# Patient Record
Sex: MALE | Race: BLACK OR AFRICAN AMERICAN | NOT HISPANIC OR LATINO | URBAN - METROPOLITAN AREA
[De-identification: names, ages, dates, MRNs, and addresses within clinical notes are randomized per-mention and may not be internally consistent; named-entity substitution may affect disease eponyms.]

---

## 2018-08-22 ENCOUNTER — OUTPATIENT (OUTPATIENT)
Dept: OUTPATIENT SERVICES | Facility: HOSPITAL | Age: 83
LOS: 1 days | End: 2018-08-22
Payer: MEDICARE

## 2018-08-22 ENCOUNTER — APPOINTMENT (OUTPATIENT)
Dept: MRI IMAGING | Facility: CLINIC | Age: 83
End: 2018-08-22

## 2018-08-22 DIAGNOSIS — Z00.8 ENCOUNTER FOR OTHER GENERAL EXAMINATION: ICD-10-CM

## 2018-08-22 PROCEDURE — 70551 MRI BRAIN STEM W/O DYE: CPT

## 2018-11-25 ENCOUNTER — INPATIENT (INPATIENT)
Facility: HOSPITAL | Age: 83
LOS: 2 days | Discharge: NURSING, REHAB OR LONG-TERM CARE | End: 2018-11-28
Attending: INTERNAL MEDICINE | Admitting: INTERNAL MEDICINE
Payer: MEDICARE

## 2018-11-25 VITALS
HEIGHT: 68 IN | OXYGEN SATURATION: 100 % | WEIGHT: 149.91 LBS | HEART RATE: 58 BPM | SYSTOLIC BLOOD PRESSURE: 138 MMHG | RESPIRATION RATE: 19 BRPM | DIASTOLIC BLOOD PRESSURE: 67 MMHG | TEMPERATURE: 98 F

## 2018-11-25 DIAGNOSIS — N18.3 CHRONIC KIDNEY DISEASE, STAGE 3 (MODERATE): ICD-10-CM

## 2018-11-25 DIAGNOSIS — I10 ESSENTIAL (PRIMARY) HYPERTENSION: ICD-10-CM

## 2018-11-25 DIAGNOSIS — R40.4 TRANSIENT ALTERATION OF AWARENESS: ICD-10-CM

## 2018-11-25 DIAGNOSIS — E16.2 HYPOGLYCEMIA, UNSPECIFIED: ICD-10-CM

## 2018-11-25 DIAGNOSIS — Z98.49 CATARACT EXTRACTION STATUS, UNSPECIFIED EYE: Chronic | ICD-10-CM

## 2018-11-25 DIAGNOSIS — H54.8 LEGAL BLINDNESS, AS DEFINED IN USA: ICD-10-CM

## 2018-11-25 DIAGNOSIS — E11.65 TYPE 2 DIABETES MELLITUS WITH HYPERGLYCEMIA: ICD-10-CM

## 2018-11-25 DIAGNOSIS — F03.90 UNSPECIFIED DEMENTIA WITHOUT BEHAVIORAL DISTURBANCE: ICD-10-CM

## 2018-11-25 LAB
ALBUMIN SERPL ELPH-MCNC: 2 G/DL — LOW (ref 3.3–5)
ALP SERPL-CCNC: 59 U/L — SIGNIFICANT CHANGE UP (ref 40–120)
ALT FLD-CCNC: 19 U/L — SIGNIFICANT CHANGE UP (ref 12–78)
ANION GAP SERPL CALC-SCNC: 9 MMOL/L — SIGNIFICANT CHANGE UP (ref 5–17)
APPEARANCE UR: CLEAR — SIGNIFICANT CHANGE UP
APTT BLD: 31.8 SEC — SIGNIFICANT CHANGE UP (ref 27.5–36.3)
AST SERPL-CCNC: 17 U/L — SIGNIFICANT CHANGE UP (ref 15–37)
BACTERIA # UR AUTO: ABNORMAL
BASOPHILS # BLD AUTO: 0.02 K/UL — SIGNIFICANT CHANGE UP (ref 0–0.2)
BASOPHILS NFR BLD AUTO: 0.3 % — SIGNIFICANT CHANGE UP (ref 0–2)
BILIRUB SERPL-MCNC: 0.2 MG/DL — SIGNIFICANT CHANGE UP (ref 0.2–1.2)
BILIRUB UR-MCNC: NEGATIVE — SIGNIFICANT CHANGE UP
BUN SERPL-MCNC: 42 MG/DL — HIGH (ref 7–23)
CALCIUM SERPL-MCNC: 8.6 MG/DL — SIGNIFICANT CHANGE UP (ref 8.5–10.1)
CHLORIDE SERPL-SCNC: 111 MMOL/L — HIGH (ref 96–108)
CO2 SERPL-SCNC: 25 MMOL/L — SIGNIFICANT CHANGE UP (ref 22–31)
COLOR SPEC: YELLOW — SIGNIFICANT CHANGE UP
CREAT SERPL-MCNC: 1.83 MG/DL — HIGH (ref 0.5–1.3)
DIFF PNL FLD: NEGATIVE — SIGNIFICANT CHANGE UP
EOSINOPHIL # BLD AUTO: 0.1 K/UL — SIGNIFICANT CHANGE UP (ref 0–0.5)
EOSINOPHIL NFR BLD AUTO: 1.4 % — SIGNIFICANT CHANGE UP (ref 0–6)
EPI CELLS # UR: SIGNIFICANT CHANGE UP
GLUCOSE BLDC GLUCOMTR-MCNC: 122 MG/DL — HIGH (ref 70–99)
GLUCOSE BLDC GLUCOMTR-MCNC: 125 MG/DL — HIGH (ref 70–99)
GLUCOSE BLDC GLUCOMTR-MCNC: 131 MG/DL — HIGH (ref 70–99)
GLUCOSE BLDC GLUCOMTR-MCNC: 79 MG/DL — SIGNIFICANT CHANGE UP (ref 70–99)
GLUCOSE SERPL-MCNC: 107 MG/DL — HIGH (ref 70–99)
GLUCOSE UR QL: 100 MG/DL
HCT VFR BLD CALC: 33.6 % — LOW (ref 39–50)
HGB BLD-MCNC: 9.9 G/DL — LOW (ref 13–17)
IMM GRANULOCYTES NFR BLD AUTO: 1.2 % — SIGNIFICANT CHANGE UP (ref 0–1.5)
INR BLD: 1.16 RATIO — SIGNIFICANT CHANGE UP (ref 0.88–1.16)
KETONES UR-MCNC: NEGATIVE — SIGNIFICANT CHANGE UP
LACTATE SERPL-SCNC: 1.1 MMOL/L — SIGNIFICANT CHANGE UP (ref 0.7–2)
LEUKOCYTE ESTERASE UR-ACNC: ABNORMAL
LYMPHOCYTES # BLD AUTO: 1.26 K/UL — SIGNIFICANT CHANGE UP (ref 1–3.3)
LYMPHOCYTES # BLD AUTO: 18.1 % — SIGNIFICANT CHANGE UP (ref 13–44)
MCHC RBC-ENTMCNC: 23.2 PG — LOW (ref 27–34)
MCHC RBC-ENTMCNC: 29.5 GM/DL — LOW (ref 32–36)
MCV RBC AUTO: 78.7 FL — LOW (ref 80–100)
MONOCYTES # BLD AUTO: 0.59 K/UL — SIGNIFICANT CHANGE UP (ref 0–0.9)
MONOCYTES NFR BLD AUTO: 8.5 % — SIGNIFICANT CHANGE UP (ref 2–14)
NEUTROPHILS # BLD AUTO: 4.9 K/UL — SIGNIFICANT CHANGE UP (ref 1.8–7.4)
NEUTROPHILS NFR BLD AUTO: 70.5 % — SIGNIFICANT CHANGE UP (ref 43–77)
NITRITE UR-MCNC: NEGATIVE — SIGNIFICANT CHANGE UP
NRBC # BLD: 0 /100 WBCS — SIGNIFICANT CHANGE UP (ref 0–0)
PH UR: 5 — SIGNIFICANT CHANGE UP (ref 5–8)
PLATELET # BLD AUTO: 291 K/UL — SIGNIFICANT CHANGE UP (ref 150–400)
POTASSIUM SERPL-MCNC: 4.5 MMOL/L — SIGNIFICANT CHANGE UP (ref 3.5–5.3)
POTASSIUM SERPL-SCNC: 4.5 MMOL/L — SIGNIFICANT CHANGE UP (ref 3.5–5.3)
PROT SERPL-MCNC: 6.7 GM/DL — SIGNIFICANT CHANGE UP (ref 6–8.3)
PROT UR-MCNC: 15 MG/DL
PROTHROM AB SERPL-ACNC: 13 SEC — HIGH (ref 10–12.9)
RBC # BLD: 4.27 M/UL — SIGNIFICANT CHANGE UP (ref 4.2–5.8)
RBC # FLD: 17.5 % — HIGH (ref 10.3–14.5)
SODIUM SERPL-SCNC: 145 MMOL/L — SIGNIFICANT CHANGE UP (ref 135–145)
SP GR SPEC: 1.01 — SIGNIFICANT CHANGE UP (ref 1.01–1.02)
TSH SERPL-MCNC: 1.79 UIU/ML — SIGNIFICANT CHANGE UP (ref 0.36–3.74)
UROBILINOGEN FLD QL: NEGATIVE MG/DL — SIGNIFICANT CHANGE UP
WBC # BLD: 6.95 K/UL — SIGNIFICANT CHANGE UP (ref 3.8–10.5)
WBC # FLD AUTO: 6.95 K/UL — SIGNIFICANT CHANGE UP (ref 3.8–10.5)
WBC UR QL: SIGNIFICANT CHANGE UP

## 2018-11-25 PROCEDURE — 99285 EMERGENCY DEPT VISIT HI MDM: CPT

## 2018-11-25 PROCEDURE — 70450 CT HEAD/BRAIN W/O DYE: CPT | Mod: 26

## 2018-11-25 PROCEDURE — 73562 X-RAY EXAM OF KNEE 3: CPT | Mod: 26,LT

## 2018-11-25 PROCEDURE — 93010 ELECTROCARDIOGRAM REPORT: CPT

## 2018-11-25 PROCEDURE — 71045 X-RAY EXAM CHEST 1 VIEW: CPT | Mod: 26

## 2018-11-25 RX ORDER — DEXTROSE 50 % IN WATER 50 %
12.5 SYRINGE (ML) INTRAVENOUS ONCE
Qty: 0 | Refills: 0 | Status: DISCONTINUED | OUTPATIENT
Start: 2018-11-25 | End: 2018-11-28

## 2018-11-25 RX ORDER — NEOMYCIN/POLYMYXIN B/DEXAMETHA 0.1 %
0 SUSPENSION, DROPS(FINAL DOSAGE FORM)(ML) OPHTHALMIC (EYE)
Qty: 0 | Refills: 0 | COMMUNITY

## 2018-11-25 RX ORDER — ATORVASTATIN CALCIUM 80 MG/1
80 TABLET, FILM COATED ORAL AT BEDTIME
Qty: 0 | Refills: 0 | Status: DISCONTINUED | OUTPATIENT
Start: 2018-11-25 | End: 2018-11-28

## 2018-11-25 RX ORDER — METOPROLOL TARTRATE 50 MG
50 TABLET ORAL DAILY
Qty: 0 | Refills: 0 | Status: DISCONTINUED | OUTPATIENT
Start: 2018-11-25 | End: 2018-11-28

## 2018-11-25 RX ORDER — ESCITALOPRAM OXALATE 10 MG/1
10 TABLET, FILM COATED ORAL DAILY
Qty: 0 | Refills: 0 | Status: DISCONTINUED | OUTPATIENT
Start: 2018-11-25 | End: 2018-11-28

## 2018-11-25 RX ORDER — CALCITRIOL 0.5 UG/1
0.25 CAPSULE ORAL DAILY
Qty: 0 | Refills: 0 | Status: DISCONTINUED | OUTPATIENT
Start: 2018-11-25 | End: 2018-11-28

## 2018-11-25 RX ORDER — CLOPIDOGREL BISULFATE 75 MG/1
75 TABLET, FILM COATED ORAL DAILY
Qty: 0 | Refills: 0 | Status: DISCONTINUED | OUTPATIENT
Start: 2018-11-25 | End: 2018-11-28

## 2018-11-25 RX ORDER — GLUCAGON INJECTION, SOLUTION 0.5 MG/.1ML
1 INJECTION, SOLUTION SUBCUTANEOUS ONCE
Qty: 0 | Refills: 0 | Status: DISCONTINUED | OUTPATIENT
Start: 2018-11-25 | End: 2018-11-28

## 2018-11-25 RX ORDER — LANOLIN ALCOHOL/MO/W.PET/CERES
1 CREAM (GRAM) TOPICAL
Qty: 0 | Refills: 0 | COMMUNITY

## 2018-11-25 RX ORDER — DOXAZOSIN MESYLATE 4 MG
1 TABLET ORAL AT BEDTIME
Qty: 0 | Refills: 0 | Status: DISCONTINUED | OUTPATIENT
Start: 2018-11-25 | End: 2018-11-28

## 2018-11-25 RX ORDER — INSULIN LISPRO 100/ML
VIAL (ML) SUBCUTANEOUS
Qty: 0 | Refills: 0 | Status: DISCONTINUED | OUTPATIENT
Start: 2018-11-25 | End: 2018-11-28

## 2018-11-25 RX ORDER — FINASTERIDE 5 MG/1
1 TABLET, FILM COATED ORAL
Qty: 0 | Refills: 0 | COMMUNITY

## 2018-11-25 RX ORDER — DONEPEZIL HYDROCHLORIDE 10 MG/1
10 TABLET, FILM COATED ORAL AT BEDTIME
Qty: 0 | Refills: 0 | Status: DISCONTINUED | OUTPATIENT
Start: 2018-11-25 | End: 2018-11-28

## 2018-11-25 RX ORDER — SODIUM CHLORIDE 9 MG/ML
1000 INJECTION, SOLUTION INTRAVENOUS
Qty: 0 | Refills: 0 | Status: DISCONTINUED | OUTPATIENT
Start: 2018-11-25 | End: 2018-11-28

## 2018-11-25 RX ORDER — METOPROLOL TARTRATE 50 MG
1 TABLET ORAL
Qty: 0 | Refills: 0 | COMMUNITY

## 2018-11-25 RX ORDER — ESCITALOPRAM OXALATE 10 MG/1
1 TABLET, FILM COATED ORAL
Qty: 0 | Refills: 0 | COMMUNITY

## 2018-11-25 RX ORDER — SODIUM CHLORIDE 9 MG/ML
1000 INJECTION INTRAMUSCULAR; INTRAVENOUS; SUBCUTANEOUS
Qty: 0 | Refills: 0 | Status: DISCONTINUED | OUTPATIENT
Start: 2018-11-25 | End: 2018-11-28

## 2018-11-25 RX ORDER — FINASTERIDE 5 MG/1
5 TABLET, FILM COATED ORAL DAILY
Qty: 0 | Refills: 0 | Status: DISCONTINUED | OUTPATIENT
Start: 2018-11-25 | End: 2018-11-28

## 2018-11-25 RX ORDER — ESOMEPRAZOLE MAGNESIUM 40 MG/1
1 CAPSULE, DELAYED RELEASE ORAL
Qty: 0 | Refills: 0 | COMMUNITY

## 2018-11-25 RX ORDER — DEXTROSE 50 % IN WATER 50 %
25 SYRINGE (ML) INTRAVENOUS ONCE
Qty: 0 | Refills: 0 | Status: DISCONTINUED | OUTPATIENT
Start: 2018-11-25 | End: 2018-11-28

## 2018-11-25 RX ORDER — DEXTROSE 50 % IN WATER 50 %
15 SYRINGE (ML) INTRAVENOUS ONCE
Qty: 0 | Refills: 0 | Status: DISCONTINUED | OUTPATIENT
Start: 2018-11-25 | End: 2018-11-28

## 2018-11-25 RX ORDER — HEPARIN SODIUM 5000 [USP'U]/ML
5000 INJECTION INTRAVENOUS; SUBCUTANEOUS EVERY 12 HOURS
Qty: 0 | Refills: 0 | Status: DISCONTINUED | OUTPATIENT
Start: 2018-11-25 | End: 2018-11-28

## 2018-11-25 RX ORDER — LANOLIN ALCOHOL/MO/W.PET/CERES
6 CREAM (GRAM) TOPICAL AT BEDTIME
Qty: 0 | Refills: 0 | Status: DISCONTINUED | OUTPATIENT
Start: 2018-11-25 | End: 2018-11-28

## 2018-11-25 RX ADMIN — SODIUM CHLORIDE 70 MILLILITER(S): 9 INJECTION INTRAMUSCULAR; INTRAVENOUS; SUBCUTANEOUS at 14:24

## 2018-11-25 RX ADMIN — Medication 6 MILLIGRAM(S): at 16:25

## 2018-11-25 RX ADMIN — Medication 1 TABLET(S): at 16:20

## 2018-11-25 RX ADMIN — HEPARIN SODIUM 5000 UNIT(S): 5000 INJECTION INTRAVENOUS; SUBCUTANEOUS at 18:10

## 2018-11-25 RX ADMIN — DONEPEZIL HYDROCHLORIDE 10 MILLIGRAM(S): 10 TABLET, FILM COATED ORAL at 16:21

## 2018-11-25 RX ADMIN — CLOPIDOGREL BISULFATE 75 MILLIGRAM(S): 75 TABLET, FILM COATED ORAL at 16:20

## 2018-11-25 RX ADMIN — ATORVASTATIN CALCIUM 80 MILLIGRAM(S): 80 TABLET, FILM COATED ORAL at 16:21

## 2018-11-25 RX ADMIN — Medication 50 MILLIGRAM(S): at 16:20

## 2018-11-25 RX ADMIN — ESCITALOPRAM OXALATE 10 MILLIGRAM(S): 10 TABLET, FILM COATED ORAL at 16:22

## 2018-11-25 RX ADMIN — FINASTERIDE 5 MILLIGRAM(S): 5 TABLET, FILM COATED ORAL at 16:22

## 2018-11-25 RX ADMIN — CALCITRIOL 0.25 MICROGRAM(S): 0.5 CAPSULE ORAL at 16:21

## 2018-11-25 RX ADMIN — Medication 1 MILLIGRAM(S): at 16:22

## 2018-11-25 NOTE — ED PROVIDER NOTE - PHYSICAL EXAMINATION
Gen: Alert, Well appearing. NAD    Head: NC, AT, PERRL, EOMI, normal lids/conjunctiva   ENT: Bilateral TM WNL, normal hearing, patent oropharynx without erythema/exudate, uvula midline  Neck: supple, no tenderness/meningismus/JVD   Pulm: Bilateral clear BS, normal resp effort, no wheeze/stridor/retractions  CV: RRR, no M/R/G, +dist pulses   Abd: soft, NT/ND, +BS, no guarding/rebound tenderness  Mskel: no edema/erythema/cyanosis   Skin: no rash   Neuro: AAOx3, no sensory/motor deficits, CN 2-12 intact    pt unable to lift leg x 1 week, but reports it is due to the knee. (+ warm/arthritis changes to left knee) able to passively fully range left knee. pulses intact. good dorsi and plantar flexion to left leg

## 2018-11-25 NOTE — ED ADULT NURSE NOTE - OBJECTIVE STATEMENT
as per family, ams, unable to care for self, visual hallucinations,  and unable to stand x 1 week. orient to self. pt ambulatory with cane, orient x3 x 3 weeks ago.

## 2018-11-25 NOTE — H&P ADULT - HISTORY OF PRESENT ILLNESS
· HPI Objective Statement: 85yo male with pmh DM (repaglinide, trajenta and possibly metformin which he took this morning), HTN, CKD, dementia, presents with declining mental status in past few months, however states altered in past week with hallucinations, and urinary/fecal incontinence and nonambulatory and just lying in bed so daughter called ambulance today. Pt noted with FS of 39, given D50 and daughter reports he is better. Pt co knee pain. Pt AOx2 but can reasonably contribute to history. Pt lives home alone. Pt was independent 3 weeks ago using the bus independently.    ROS: No fever/chills. No photophobia/eye pain/changes in vision, No ear pain/sore throat/dysphagia, No chest pain/palpitations. No SOB/cough/stridor. No abdominal pain, N/V/D, no black/bloody bm. No dysuria/frequency/discharge, No headache. No Dizziness.  No rash.  No numbness/tingling/weakness.

## 2018-11-25 NOTE — ED PROVIDER NOTE - OBJECTIVE STATEMENT
83yo male with pmh DM (repaglinide, trajenta and possibly metformin which he took this morning), HTN, CKD, dementia, presents with declining mental status in past few months, however states altered in past week with hallucinations and nonambulatory and just lying in bed so daughter called ambulance today. Pt noted with FS of 39, given D50 and daughter reports he is better. Pt co knee pain. Pt AOx2 but can reasonably contribute to history. Pt lives home alone. Pt was independent 3 weeks ago using the bus independently.      ROS: No fever/chills. No photophobia/eye pain/changes in vision, No ear pain/sore throat/dysphagia, No chest pain/palpitations. No SOB/cough/stridor. No abdominal pain, N/V/D, no black/bloody bm. No dysuria/frequency/discharge, No headache. No Dizziness.  No rash.  No numbness/tingling/weakness. 85yo male with pmh DM (repaglinide, trajenta and possibly metformin which he took this morning), HTN, CKD, dementia, presents with declining mental status in past few months, however states altered in past week with hallucinations, and urinary/fecal incontinence and nonambulatory and just lying in bed so daughter called ambulance today. Pt noted with FS of 39, given D50 and daughter reports he is better. Pt co knee pain. Pt AOx2 but can reasonably contribute to history. Pt lives home alone. Pt was independent 3 weeks ago using the bus independently.      ROS: No fever/chills. No photophobia/eye pain/changes in vision, No ear pain/sore throat/dysphagia, No chest pain/palpitations. No SOB/cough/stridor. No abdominal pain, N/V/D, no black/bloody bm. No dysuria/frequency/discharge, No headache. No Dizziness.  No rash.  No numbness/tingling/weakness.

## 2018-11-25 NOTE — ED PROVIDER NOTE - CARE PLAN
Principal Discharge DX:	Hypoglycemia Principal Discharge DX:	Hypoglycemia  Secondary Diagnosis:	Arthritis

## 2018-11-25 NOTE — H&P ADULT - ASSESSMENT
IMPROVE VTE Individual Risk Assessment    RISK                                                                Points    [  ] Previous VTE                                                  3    [  ] Thrombophilia                                               2    [  ] Lower limb paralysis                                      2        (unable to hold up >15 seconds)      [  ] Current Cancer                                              2         (within 6 months)    [x  ] Immobilization > 24 hrs                                1    [  ] ICU/CCU stay > 24 hours                              1    [ x ] Age > 60                                                      1    IMPROVE VTE Score ___2______    85yo male with pmh DM (repaglinide, trajenta and possibly metformin which he took this morning), HTN, CKD, dementia, presents with declining mental status in past few months, however states altered in past week with hallucinations, and urinary/fecal incontinence and nonambulatory and just lying in bed so daughter called ambulance today. Pt noted with FS of 39, given D50 and daughter reports he is better. Pt co knee pain. Pt AOx2 but can reasonably contribute to history. Pt lives home alone. Pt was independent 3 weeks ago using the bus independently.

## 2018-11-25 NOTE — ED ADULT NURSE NOTE - PMH
Dementia    DM (diabetes mellitus)    Glaucoma    HTN (hypertension) Anemia due to stage 3 chronic kidney disease    Benign prostatic hyperplasia with lower urinary tract symptoms, symptom details unspecified    CKD (chronic kidney disease) stage 3, GFR 30-59 ml/min    Dementia    DM (diabetes mellitus)    Glaucoma    HTN (hypertension)    Legally blind  Both Eyes

## 2018-11-25 NOTE — H&P ADULT - NSHPPHYSICALEXAM_GEN_ALL_CORE
GENERAL: NAD, well-groomed, well-developed  HEAD:  Atraumatic, Normocephalic  EYES: EOMI, PERRL, conjunctiva clear ,sclera scarred bilaterally.  ENMT:  Moist mucous membranes, Good dentition, No lesions  NECK: Supple, No JVD, Normal thyroid  NERVOUS SYSTEM:  Alert & Oriented X3, Good concentration; Motor Strength 5/5 B/L upper and lower extremities; DTRs 2+ intact and symmetric  CHEST/LUNG: Clear to percussion bilaterally; No rales, rhonchi, wheezing, or rubs  HEART: Regular rate and rhythm; No murmurs, rubs, or gallops  ABDOMEN: Soft, Nontender, Nondistended; Bowel sounds present  EXTREMITIES:  2+ Peripheral Pulses, No clubbing, cyanosis, or edema  LYMPH: No lymphadenopathy noted  SKIN: No rashes or lesions

## 2018-11-25 NOTE — ED PROVIDER NOTE - MEDICAL DECISION MAKING DETAILS
pt with hypoglycemia on oral hypoglycemics. will need monitoring. SW  as well but symptoms coulg all be related to hypoglycemia, possibly UTI. dr skinner aware for admission.

## 2018-11-25 NOTE — H&P ADULT - NSHPLABSRESULTS_GEN_ALL_CORE
9.9    6.95  )-----------( 291      ( 25 Nov 2018 14:27 )             33.6     11-25    145  |  111<H>  |  42<H>  ----------------------------<  107<H>  4.5   |  25  |  1.83<H>    Ca    8.6      25 Nov 2018 14:27    TPro  6.7  /  Alb  2.0<L>  /  TBili  0.2  /  DBili  x   /  AST  17  /  ALT  19  /  AlkPhos  59  11-25    PT/INR - ( 25 Nov 2018 14:27 )   PT: 13.0 sec;   INR: 1.16 ratio         PTT - ( 25 Nov 2018 14:27 )  PTT:31.8 sec    CAPILLARY BLOOD GLUCOSE      POCT Blood Glucose.: 79 mg/dL (25 Nov 2018 15:30)  POCT Blood Glucose.: 122 mg/dL (25 Nov 2018 13:48)            Urine Culture:      Blood Culture:    < from: CT Head No Cont (11.25.18 @ 15:12) >    Impression: No evidence of acute hemorrhage, mass or mass effect.    Small focus of high attenuation is seen anterior to the right globe as   described above.    < end of copied text >

## 2018-11-25 NOTE — H&P ADULT - PMH
Anemia due to stage 3 chronic kidney disease    Benign prostatic hyperplasia with lower urinary tract symptoms, symptom details unspecified    CKD (chronic kidney disease) stage 3, GFR 30-59 ml/min    Dementia    DM (diabetes mellitus)    Glaucoma    HTN (hypertension)    Legally blind  Both Eyes

## 2018-11-25 NOTE — ED ADULT TRIAGE NOTE - CHIEF COMPLAINT QUOTE
patient BIBA as per EMS patient FS 39 at home , EMS give D50% 1 amp , patient A&Ox3 at the time of triage , denied headache denied chest pain , c/o of weakness

## 2018-11-26 LAB
ANION GAP SERPL CALC-SCNC: 6 MMOL/L — SIGNIFICANT CHANGE UP (ref 5–17)
BASOPHILS # BLD AUTO: 0.02 K/UL — SIGNIFICANT CHANGE UP (ref 0–0.2)
BASOPHILS NFR BLD AUTO: 0.3 % — SIGNIFICANT CHANGE UP (ref 0–2)
BUN SERPL-MCNC: 42 MG/DL — HIGH (ref 7–23)
CALCIUM SERPL-MCNC: 8.4 MG/DL — LOW (ref 8.5–10.1)
CHLORIDE SERPL-SCNC: 115 MMOL/L — HIGH (ref 96–108)
CO2 SERPL-SCNC: 26 MMOL/L — SIGNIFICANT CHANGE UP (ref 22–31)
CREAT SERPL-MCNC: 1.83 MG/DL — HIGH (ref 0.5–1.3)
CULTURE RESULTS: SIGNIFICANT CHANGE UP
EOSINOPHIL # BLD AUTO: 0.12 K/UL — SIGNIFICANT CHANGE UP (ref 0–0.5)
EOSINOPHIL NFR BLD AUTO: 1.8 % — SIGNIFICANT CHANGE UP (ref 0–6)
GLUCOSE BLDC GLUCOMTR-MCNC: 111 MG/DL — HIGH (ref 70–99)
GLUCOSE BLDC GLUCOMTR-MCNC: 123 MG/DL — HIGH (ref 70–99)
GLUCOSE BLDC GLUCOMTR-MCNC: 180 MG/DL — HIGH (ref 70–99)
GLUCOSE BLDC GLUCOMTR-MCNC: 74 MG/DL — SIGNIFICANT CHANGE UP (ref 70–99)
GLUCOSE SERPL-MCNC: 74 MG/DL — SIGNIFICANT CHANGE UP (ref 70–99)
HBA1C BLD-MCNC: 6.2 % — HIGH (ref 4–5.6)
HCT VFR BLD CALC: 31.1 % — LOW (ref 39–50)
HGB BLD-MCNC: 9.2 G/DL — LOW (ref 13–17)
IMM GRANULOCYTES NFR BLD AUTO: 0.8 % — SIGNIFICANT CHANGE UP (ref 0–1.5)
LYMPHOCYTES # BLD AUTO: 1.5 K/UL — SIGNIFICANT CHANGE UP (ref 1–3.3)
LYMPHOCYTES # BLD AUTO: 22.9 % — SIGNIFICANT CHANGE UP (ref 13–44)
MCHC RBC-ENTMCNC: 23.5 PG — LOW (ref 27–34)
MCHC RBC-ENTMCNC: 29.6 GM/DL — LOW (ref 32–36)
MCV RBC AUTO: 79.3 FL — LOW (ref 80–100)
MONOCYTES # BLD AUTO: 0.59 K/UL — SIGNIFICANT CHANGE UP (ref 0–0.9)
MONOCYTES NFR BLD AUTO: 9 % — SIGNIFICANT CHANGE UP (ref 2–14)
NEUTROPHILS # BLD AUTO: 4.26 K/UL — SIGNIFICANT CHANGE UP (ref 1.8–7.4)
NEUTROPHILS NFR BLD AUTO: 65.2 % — SIGNIFICANT CHANGE UP (ref 43–77)
NRBC # BLD: 0 /100 WBCS — SIGNIFICANT CHANGE UP (ref 0–0)
PLATELET # BLD AUTO: 273 K/UL — SIGNIFICANT CHANGE UP (ref 150–400)
POTASSIUM SERPL-MCNC: 4.9 MMOL/L — SIGNIFICANT CHANGE UP (ref 3.5–5.3)
POTASSIUM SERPL-SCNC: 4.9 MMOL/L — SIGNIFICANT CHANGE UP (ref 3.5–5.3)
RBC # BLD: 3.92 M/UL — LOW (ref 4.2–5.8)
RBC # FLD: 17.5 % — HIGH (ref 10.3–14.5)
SODIUM SERPL-SCNC: 147 MMOL/L — HIGH (ref 135–145)
SPECIMEN SOURCE: SIGNIFICANT CHANGE UP
WBC # BLD: 6.54 K/UL — SIGNIFICANT CHANGE UP (ref 3.8–10.5)
WBC # FLD AUTO: 6.54 K/UL — SIGNIFICANT CHANGE UP (ref 3.8–10.5)

## 2018-11-26 RX ADMIN — CALCITRIOL 0.25 MICROGRAM(S): 0.5 CAPSULE ORAL at 11:30

## 2018-11-26 RX ADMIN — DONEPEZIL HYDROCHLORIDE 10 MILLIGRAM(S): 10 TABLET, FILM COATED ORAL at 21:31

## 2018-11-26 RX ADMIN — ATORVASTATIN CALCIUM 80 MILLIGRAM(S): 80 TABLET, FILM COATED ORAL at 21:31

## 2018-11-26 RX ADMIN — ESCITALOPRAM OXALATE 10 MILLIGRAM(S): 10 TABLET, FILM COATED ORAL at 11:30

## 2018-11-26 RX ADMIN — CLOPIDOGREL BISULFATE 75 MILLIGRAM(S): 75 TABLET, FILM COATED ORAL at 11:30

## 2018-11-26 RX ADMIN — SODIUM CHLORIDE 70 MILLILITER(S): 9 INJECTION INTRAMUSCULAR; INTRAVENOUS; SUBCUTANEOUS at 18:28

## 2018-11-26 RX ADMIN — FINASTERIDE 5 MILLIGRAM(S): 5 TABLET, FILM COATED ORAL at 11:30

## 2018-11-26 RX ADMIN — Medication 6 MILLIGRAM(S): at 21:31

## 2018-11-26 RX ADMIN — HEPARIN SODIUM 5000 UNIT(S): 5000 INJECTION INTRAVENOUS; SUBCUTANEOUS at 05:46

## 2018-11-26 RX ADMIN — SODIUM CHLORIDE 70 MILLILITER(S): 9 INJECTION INTRAMUSCULAR; INTRAVENOUS; SUBCUTANEOUS at 03:10

## 2018-11-26 RX ADMIN — Medication 1: at 11:27

## 2018-11-26 RX ADMIN — Medication 1 MILLIGRAM(S): at 21:31

## 2018-11-26 RX ADMIN — HEPARIN SODIUM 5000 UNIT(S): 5000 INJECTION INTRAVENOUS; SUBCUTANEOUS at 18:28

## 2018-11-26 RX ADMIN — Medication 1 TABLET(S): at 11:27

## 2018-11-26 NOTE — PHYSICAL THERAPY INITIAL EVALUATION ADULT - GAIT DEVIATIONS NOTED, PT EVAL
decreased weight-shifting ability/increased stride width/decreased stride length/decreased step length/decreased himanshu

## 2018-11-26 NOTE — PROGRESS NOTE ADULT - SUBJECTIVE AND OBJECTIVE BOX
Patient is a 84y old  Male who presents with a chief complaint of AMS (2018 15:59)      INTERVAL HPI/OVERNIGHT EVENTS:    MEDICATIONS  (STANDING):  atorvastatin 80 milliGRAM(s) Oral at bedtime  calcitriol   Capsule 0.25 MICROGram(s) Oral daily  clopidogrel Tablet 75 milliGRAM(s) Oral daily  dextrose 5%. 1000 milliLiter(s) (50 mL/Hr) IV Continuous <Continuous>  dextrose 50% Injectable 12.5 Gram(s) IV Push once  dextrose 50% Injectable 25 Gram(s) IV Push once  donepezil 10 milliGRAM(s) Oral at bedtime  doxazosin 1 milliGRAM(s) Oral at bedtime  escitalopram 10 milliGRAM(s) Oral daily  finasteride 5 milliGRAM(s) Oral daily  heparin  Injectable 5000 Unit(s) SubCutaneous every 12 hours  insulin lispro (HumaLOG) corrective regimen sliding scale   SubCutaneous three times a day before meals  melatonin 6 milliGRAM(s) Oral at bedtime  metoprolol succinate ER 50 milliGRAM(s) Oral daily  multivitamin 1 Tablet(s) Oral daily  sodium chloride 0.9%. 1000 milliLiter(s) (70 mL/Hr) IV Continuous <Continuous>    MEDICATIONS  (PRN):  dextrose 40% Gel 15 Gram(s) Oral once PRN Blood Glucose LESS THAN 70 milliGRAM(s)/deciliter  glucagon  Injectable 1 milliGRAM(s) IntraMuscular once PRN Glucose LESS THAN 70 milligrams/deciliter      Allergies    No Known Allergies    Intolerances        REVIEW OF SYSTEMS:  CONSTITUTIONAL: No fever, weight loss, or fatigue  EYES: No eye pain, visual disturbances, or discharge  ENMT:  No difficulty hearing, tinnitus, vertigo; No sinus or throat pain  NECK: No pain or stiffness  BREASTS: No pain, masses, or nipple discharge  RESPIRATORY: No cough, wheezing, chills or hemoptysis; No shortness of breath  CARDIOVASCULAR: No chest pain, palpitations, dizziness, or leg swelling  GASTROINTESTINAL: No abdominal or epigastric pain. No nausea, vomiting, or hematemesis; No diarrhea or constipation. No melena or hematochezia.  GENITOURINARY: No dysuria, frequency, hematuria, or incontinence  NEUROLOGICAL: No headaches, memory loss, loss of strength, numbness, or tremors  SKIN: No itching, burning, rashes, or lesions   LYMPH NODES: No enlarged glands  ENDOCRINE: No heat or cold intolerance; No hair loss  MUSCULOSKELETAL: No joint pain or swelling; No muscle, back, or extremity pain  PSYCHIATRIC: No depression, anxiety, mood swings, or difficulty sleeping  HEME/LYMPH: No easy bruising, or bleeding gums  ALLERGY AND IMMUNOLOGIC: No hives or eczema    Vital Signs Last 24 Hrs  T(C): 37.3 (2018 11:07), Max: 37.3 (2018 11:07)  T(F): 99.2 (2018 11:07), Max: 99.2 (2018 11:07)  HR: 68 (2018 11:30) (55 - 70)  BP: 111/62 (2018 11:30) (100/50 - 139/65)  BP(mean): --  RR: 18 (2018 11:07) (16 - 18)  SpO2: 98% (2018 11:30) (98% - 100%)    PHYSICAL EXAM:  GENERAL: NAD, well-groomed, well-developed  HEAD:  Atraumatic, Normocephalic  EYES: EOMI, PERRL, conjunctiva and sclera clear  ENMT:  Moist mucous membranes, Good dentition, No lesions  NECK: Supple, No JVD, Normal thyroid  NERVOUS SYSTEM:  Alert & Oriented X3, Good concentration; Motor Strength 5/5 B/L upper and lower extremities; DTRs 2+ intact and symmetric  CHEST/LUNG: Clear to percussion bilaterally; No rales, rhonchi, wheezing, or rubs  HEART: Regular rate and rhythm; No murmurs, rubs, or gallops  ABDOMEN: Soft, Nontender, Nondistended; Bowel sounds present  EXTREMITIES:  2+ Peripheral Pulses, No clubbing, cyanosis, or edema  LYMPH: No lymphadenopathy noted  SKIN: No rashes or lesions    LABS:                        9.2    6.54  )-----------( 273      ( 2018 06:21 )             31.1     -    147<H>  |  115<H>  |  42<H>  ----------------------------<  74  4.9   |  26  |  1.83<H>    Ca    8.4<L>      2018 06:21    TPro  6.7  /  Alb  2.0<L>  /  TBili  0.2  /  DBili  x   /  AST  17  /  ALT  19  /  AlkPhos  59  11-25    PT/INR - ( 2018 14:27 )   PT: 13.0 sec;   INR: 1.16 ratio         PTT - ( 2018 14:27 )  PTT:31.8 sec  Urinalysis Basic - ( 2018 16:30 )    Color: Yellow / Appearance: Clear / S.015 / pH: x  Gluc: x / Ketone: Negative  / Bili: Negative / Urobili: Negative mg/dL   Blood: x / Protein: 15 mg/dL / Nitrite: Negative   Leuk Esterase: Trace / RBC: x / WBC 3-5   Sq Epi: x / Non Sq Epi: Occasional / Bacteria: Few      CAPILLARY BLOOD GLUCOSE      POCT Blood Glucose.: 180 mg/dL (2018 10:41)  POCT Blood Glucose.: 74 mg/dL (2018 07:32)  POCT Blood Glucose.: 131 mg/dL (2018 21:43)  POCT Blood Glucose.: 125 mg/dL (2018 16:42)  POCT Blood Glucose.: 79 mg/dL (2018 15:30)          Hemoglobin A1C, Whole Blood: 6.2 % ( @ 07:51)    RADIOLOGY & ADDITIONAL TESTS:  < from: CT Head No Cont (18 @ 15:12) >  Impression: No evidence of acute hemorrhage, mass or mass effect.    Small focus of high attenuation is seen anterior to the right globe as   described above.    < end of copied text >    Imaging Personally Reviewed:  x[ ] YES  [ ] NO    Consultant(s) Notes Reviewed:  [ ] YES  [ ] NO    Care Discussed with Consultants/Other Providers [ ] YES  [ ] NO    PROBLEMS:  HYPOGLYCEMIA, ARTHRITIS  AMS LOW BLOOD SUGAR  Hypoglycemia  Dementia without behavioral disturbance, unspecified dementia type  Anemia due to stage 3 chronic kidney disease  CKD (chronic kidney disease) stage 3, GFR 30-59 ml/min  Legally blind  Type 2 diabetes mellitus with hyperglycemia, without long-term current use of insulin  Essential hypertension  Transient alteration of awareness  Hypoglycemia      Care discussed with family,         [  ]   yes  [  ]  No    imp:    stable[ ]    unstable[  ]     improving [   ]       unchanged  [  ]                Plans:  Continue present plans  [  ]               New consult [  ]   specialty  .......               order chad[  ]    test name.                  Discharge Planning  [  ]

## 2018-11-26 NOTE — PHYSICAL THERAPY INITIAL EVALUATION ADULT - GENERAL OBSERVATIONS, REHAB EVAL
Chart (EMR) reviewed. Received sitting at bedside chair, NAD. Alert. Ox4. Able to follow multistep commands/directions. Legally blind. Patient c/o pain upon movement to L knee c pain meds earlier. CT of head negative acute findings. X-ray of L knee negative fracture/dislocation.

## 2018-11-26 NOTE — PHYSICAL THERAPY INITIAL EVALUATION ADULT - CRITERIA FOR SKILLED THERAPEUTIC INTERVENTIONS
impairments found/rehab potential/predicted duration of therapy intervention/risk reduction/prevention/therapy frequency/functional limitations in following categories

## 2018-11-26 NOTE — PHYSICAL THERAPY INITIAL EVALUATION ADULT - PLANNED THERAPY INTERVENTIONS, PT EVAL
postural re-education/gait training/strengthening/transfer training/ROM/balance training/bed mobility training

## 2018-11-26 NOTE — PHYSICAL THERAPY INITIAL EVALUATION ADULT - ACTIVE RANGE OF MOTION EXAMINATION, REHAB EVAL
rachel. upper extremity Active ROM was WNL (within normal limits)/Active assistive c L knee due to pain/bilateral lower extremity Active ROM was WNL (within normal limits)/deficits as listed below

## 2018-11-26 NOTE — CONSULT NOTE ADULT - SUBJECTIVE AND OBJECTIVE BOX
Information from chart:  "Patient is a 84y old  Male who presents with a chief complaint of AMS (2018 14:50)    HPI:  · HPI Objective Statement: 83yo male with pmh DM (repaglinide, trajenta and possibly metformin which he took this morning), HTN, CKD, dementia, presents with declining mental status in past few months, however states altered in past week with hallucinations, and urinary/fecal incontinence and nonambulatory and just lying in bed so daughter called ambulance today. Pt noted with FS of 39, given D50 and daughter reports he is better. Pt co knee pain. Pt AOx2 but can reasonably contribute to history. Pt lives home alone. Pt was independent 3 weeks ago using the bus independently.    ROS: No fever/chills. No photophobia/eye pain/changes in vision, No ear pain/sore throat/dysphagia, No chest pain/palpitations. No SOB/cough/stridor. No abdominal pain, N/V/D, no black/bloody bm. No dysuria/frequency/discharge, No headache. No Dizziness.  No rash.  No numbness/tingling/weakness. (2018 15:59)   "    Patient being followed by Dr. Maloney twice yearly;   No new complaints;     PAST MEDICAL & SURGICAL HISTORY:  Anemia due to stage 3 chronic kidney disease  CKD (chronic kidney disease) stage 3, GFR 30-59 ml/min  Benign prostatic hyperplasia with lower urinary tract symptoms, symptom details unspecified  Legally blind: Both Eyes  Dementia  Glaucoma  HTN (hypertension)  DM (diabetes mellitus)  Status post cataract extraction, unspecified laterality: Both eyes    FAMILY HISTORY:  No pertinent family history in first degree relatives    Allergies    No Known Allergies    Intolerances      Home Medications:  atorvastatin 80 mg oral tablet: orally once a day (at bedtime) (2018 14:07)  calcitriol 0.25 mcg oral capsule: orally 3 times a week (2018 14:07)  clopidogrel 75 mg oral tablet: 1 tab(s) orally once a day (2018 14:07)  donepezil 10 mg oral tablet: 1 tab(s) orally once a day (at bedtime) (2018 14:07)  doxazosin 1 mg oral tablet: 1 tab(s) orally once a day (2018 14:07)  escitalopram 10 mg oral tablet: 1 tab(s) orally once a day (2018 14:07)  esomeprazole 40 mg oral delayed release capsule: 1 cap(s) orally once a day (2018 14:07)  finasteride 5 mg oral tablet: 1 tab(s) orally once a day (2018 14:07)  Melatonin 5 mg oral tablet: 1 tab(s) orally once a day (at bedtime) (2018 14:07)  Metoprolol Succinate ER 50 mg oral tablet, extended release: 1 tab(s) orally once a day (2018 14:07)  Nephro-Nima oral tablet: 1 tab(s) orally once a day (2018 14:07)  repaglinide 1 mg oral tablet: 1 tab(s) orally 3 times a day (before meals) (2018 14:07)  Tradjenta 5 mg oral tablet: 1 tab(s) orally once a day (2018 14:22)    MEDICATIONS  (STANDING):  atorvastatin 80 milliGRAM(s) Oral at bedtime  calcitriol   Capsule 0.25 MICROGram(s) Oral daily  clopidogrel Tablet 75 milliGRAM(s) Oral daily  dextrose 5%. 1000 milliLiter(s) (50 mL/Hr) IV Continuous <Continuous>  dextrose 50% Injectable 12.5 Gram(s) IV Push once  dextrose 50% Injectable 25 Gram(s) IV Push once  donepezil 10 milliGRAM(s) Oral at bedtime  doxazosin 1 milliGRAM(s) Oral at bedtime  escitalopram 10 milliGRAM(s) Oral daily  finasteride 5 milliGRAM(s) Oral daily  heparin  Injectable 5000 Unit(s) SubCutaneous every 12 hours  insulin lispro (HumaLOG) corrective regimen sliding scale   SubCutaneous three times a day before meals  melatonin 6 milliGRAM(s) Oral at bedtime  metoprolol succinate ER 50 milliGRAM(s) Oral daily  multivitamin 1 Tablet(s) Oral daily  sodium chloride 0.9%. 1000 milliLiter(s) (70 mL/Hr) IV Continuous <Continuous>    MEDICATIONS  (PRN):  dextrose 40% Gel 15 Gram(s) Oral once PRN Blood Glucose LESS THAN 70 milliGRAM(s)/deciliter  glucagon  Injectable 1 milliGRAM(s) IntraMuscular once PRN Glucose LESS THAN 70 milligrams/deciliter    Vital Signs Last 24 Hrs  T(C): 37.3 (2018 11:07), Max: 37.3 (2018 11:07)  T(F): 99.2 (2018 11:07), Max: 99.2 (2018 11:07)  HR: 65 (2018 16:22) (55 - 68)  BP: 111/62 (2018 11:30) (100/50 - 116/58)  BP(mean): --  RR: 17 (2018 16:22) (16 - 18)  SpO2: 97% (2018 16:22) (97% - 100%)    Daily Height in cm: 172.72 (2018 18:49)    Daily     18 @ 07:01  -  18 @ 07:00  --------------------------------------------------------  IN: 840 mL / OUT: 800 mL / NET: 40 mL      CAPILLARY BLOOD GLUCOSE      POCT Blood Glucose.: 111 mg/dL (2018 16:08)  POCT Blood Glucose.: 180 mg/dL (2018 10:41)  POCT Blood Glucose.: 74 mg/dL (2018 07:32)  POCT Blood Glucose.: 131 mg/dL (2018 21:43)    PHYSICAL EXAM:      T(C): 37.3 (18 @ 11:07), Max: 37.3 (18 @ 11:07)  HR: 65 (18 @ 16:22) (55 - 68)  BP: 111/62 (18 @ 11:30) (100/50 - 116/58)  RR: 17 (18 @ 16:22) (16 - 18)  SpO2: 97% (18 @ 16:22) (97% - 100%)  Wt(kg): --  Respiratory: clear anteriorly, decreased BS at bases  Cardiovascular: S1 S2  Gastrointestinal: soft NT ND +BS  Extremities:   1 edema                  147<H>  |  115<H>  |  42<H>  ----------------------------<  74  4.9   |  26  |  1.83<H>    Ca    8.4<L>      2018 06:21    TPro  6.7  /  Alb  2.0<L>  /  TBili  0.2  /  DBili  x   /  AST  17  /  ALT  19  /  AlkPhos  59  11-25                          9.2    6.54  )-----------( 273      ( 2018 06:21 )             31.1     Urinalysis Basic - ( 2018 16:30 )    Color: Yellow / Appearance: Clear / S.015 / pH: x  Gluc: x / Ketone: Negative  / Bili: Negative / Urobili: Negative mg/dL   Blood: x / Protein: 15 mg/dL / Nitrite: Negative   Leuk Esterase: Trace / RBC: x / WBC 3-5   Sq Epi: x / Non Sq Epi: Occasional / Bacteria: Few        Assessment and Plan  CKD 3; HTN, DM; stable;   Continue supportive care;   Paraprotein screen.

## 2018-11-26 NOTE — PHYSICAL THERAPY INITIAL EVALUATION ADULT - ADDITIONAL COMMENTS
As per patient, lives alone in the basement of a pvt house. Son and ex-wife lives on the main floor. Independent c ADL's and ambulation c straight cane but for the past month required assistance c ADL's from ex-wife and has difficulty walking.

## 2018-11-26 NOTE — PHYSICAL THERAPY INITIAL EVALUATION ADULT - RANGE OF MOTION EXAMINATION, REHAB EVAL
bilateral lower extremity was ROM was WNL (within normal limits)/deficits as listed below/Active assistive c L knee due to pain./bilateral upper extremity ROM was WNL (within normal limits)

## 2018-11-26 NOTE — PHYSICAL THERAPY INITIAL EVALUATION ADULT - PERTINENT HX OF CURRENT PROBLEM, REHAB EVAL
Patient is an 83 y/o male admitted to [facility] due presents with declining mental status in past few months, however states altered in past week with hallucinations, and urinary/fecal incontinence and nonambulatory and just lying in bed so daughter called ambulance today.

## 2018-11-26 NOTE — PROGRESS NOTE ADULT - ASSESSMENT
IMPROVE VTE Individual Risk Assessment    RISK                                                                Points    [  ] Previous VTE                                                  3    [  ] Thrombophilia                                               2    [  ] Lower limb paralysis                                      2        (unable to hold up >15 seconds)      [  ] Current Cancer                                              2         (within 6 months)    [x  ] Immobilization > 24 hrs                                1    [  ] ICU/CCU stay > 24 hours                              1    [ x ] Age > 60                                                      1    IMPROVE VTE Score ___2______    83yo male with pmh DM (repaglinide, trajenta and possibly metformin which he took this morning), HTN, CKD, dementia, presents with declining mental status in past few months, however states altered in past week with hallucinations, and urinary/fecal incontinence and nonambulatory and just lying in bed so daughter called ambulance today. Pt noted with FS of 39, given D50 and daughter reports he is better. Pt co knee pain. Pt AOx2 but can reasonably contribute to history. Pt lives home alone. Pt was independent 3 weeks ago using the bus independently.  11/26/18 ; Alert, asymptomatic. renal consult called. PT evaluation

## 2018-11-27 LAB
GLUCOSE BLDC GLUCOMTR-MCNC: 138 MG/DL — HIGH (ref 70–99)
GLUCOSE BLDC GLUCOMTR-MCNC: 169 MG/DL — HIGH (ref 70–99)
GLUCOSE BLDC GLUCOMTR-MCNC: 264 MG/DL — HIGH (ref 70–99)
GLUCOSE BLDC GLUCOMTR-MCNC: 84 MG/DL — SIGNIFICANT CHANGE UP (ref 70–99)

## 2018-11-27 RX ADMIN — DONEPEZIL HYDROCHLORIDE 10 MILLIGRAM(S): 10 TABLET, FILM COATED ORAL at 22:09

## 2018-11-27 RX ADMIN — ESCITALOPRAM OXALATE 10 MILLIGRAM(S): 10 TABLET, FILM COATED ORAL at 11:36

## 2018-11-27 RX ADMIN — Medication 6 MILLIGRAM(S): at 22:08

## 2018-11-27 RX ADMIN — Medication 3: at 16:21

## 2018-11-27 RX ADMIN — ATORVASTATIN CALCIUM 80 MILLIGRAM(S): 80 TABLET, FILM COATED ORAL at 22:08

## 2018-11-27 RX ADMIN — CALCITRIOL 0.25 MICROGRAM(S): 0.5 CAPSULE ORAL at 11:36

## 2018-11-27 RX ADMIN — Medication 1 TABLET(S): at 11:36

## 2018-11-27 RX ADMIN — HEPARIN SODIUM 5000 UNIT(S): 5000 INJECTION INTRAVENOUS; SUBCUTANEOUS at 06:01

## 2018-11-27 RX ADMIN — HEPARIN SODIUM 5000 UNIT(S): 5000 INJECTION INTRAVENOUS; SUBCUTANEOUS at 18:22

## 2018-11-27 RX ADMIN — SODIUM CHLORIDE 70 MILLILITER(S): 9 INJECTION INTRAMUSCULAR; INTRAVENOUS; SUBCUTANEOUS at 06:01

## 2018-11-27 RX ADMIN — FINASTERIDE 5 MILLIGRAM(S): 5 TABLET, FILM COATED ORAL at 11:36

## 2018-11-27 RX ADMIN — SODIUM CHLORIDE 70 MILLILITER(S): 9 INJECTION INTRAMUSCULAR; INTRAVENOUS; SUBCUTANEOUS at 21:38

## 2018-11-27 RX ADMIN — Medication 1 MILLIGRAM(S): at 22:08

## 2018-11-27 RX ADMIN — Medication 50 MILLIGRAM(S): at 06:01

## 2018-11-27 RX ADMIN — CLOPIDOGREL BISULFATE 75 MILLIGRAM(S): 75 TABLET, FILM COATED ORAL at 11:36

## 2018-11-27 NOTE — PROGRESS NOTE ADULT - ASSESSMENT
IMPROVE VTE Individual Risk Assessment    RISK                                                                Points    [  ] Previous VTE                                                  3    [  ] Thrombophilia                                               2    [  ] Lower limb paralysis                                      2        (unable to hold up >15 seconds)      [  ] Current Cancer                                              2         (within 6 months)    [x  ] Immobilization > 24 hrs                                1    [  ] ICU/CCU stay > 24 hours                              1    [ x ] Age > 60                                                      1    IMPROVE VTE Score ___2______    85yo male with pmh DM (repaglinide, trajenta and possibly metformin which he took this morning), HTN, CKD, dementia, presents with declining mental status in past few months, however states altered in past week with hallucinations, and urinary/fecal incontinence and nonambulatory and just lying in bed so daughter called ambulance today. Pt noted with FS of 39, given D50 and daughter reports he is better. Pt co knee pain. Pt AOx2 but can reasonably contribute to history. Pt lives home alone. Pt was independent 3 weeks ago using the bus independently.  11/26/18 ; Alert, asymptomatic. renal consult called. PT evaluation.  11/27/18 : Alert, awake. Asymptomatic. PT eval noted . For STR. Renal consult noted.

## 2018-11-27 NOTE — PROGRESS NOTE ADULT - SUBJECTIVE AND OBJECTIVE BOX
Patient is a 84y old  Male who presents with a chief complaint of AMS (2018 17:26)      INTERVAL HPI/OVERNIGHT EVENTS:    MEDICATIONS  (STANDING):  atorvastatin 80 milliGRAM(s) Oral at bedtime  calcitriol   Capsule 0.25 MICROGram(s) Oral daily  clopidogrel Tablet 75 milliGRAM(s) Oral daily  dextrose 5%. 1000 milliLiter(s) (50 mL/Hr) IV Continuous <Continuous>  dextrose 50% Injectable 12.5 Gram(s) IV Push once  dextrose 50% Injectable 25 Gram(s) IV Push once  donepezil 10 milliGRAM(s) Oral at bedtime  doxazosin 1 milliGRAM(s) Oral at bedtime  escitalopram 10 milliGRAM(s) Oral daily  finasteride 5 milliGRAM(s) Oral daily  heparin  Injectable 5000 Unit(s) SubCutaneous every 12 hours  insulin lispro (HumaLOG) corrective regimen sliding scale   SubCutaneous three times a day before meals  melatonin 6 milliGRAM(s) Oral at bedtime  metoprolol succinate ER 50 milliGRAM(s) Oral daily  multivitamin 1 Tablet(s) Oral daily  sodium chloride 0.9%. 1000 milliLiter(s) (70 mL/Hr) IV Continuous <Continuous>    MEDICATIONS  (PRN):  dextrose 40% Gel 15 Gram(s) Oral once PRN Blood Glucose LESS THAN 70 milliGRAM(s)/deciliter  glucagon  Injectable 1 milliGRAM(s) IntraMuscular once PRN Glucose LESS THAN 70 milligrams/deciliter      Allergies    No Known Allergies    Intolerances        REVIEW OF SYSTEMS:  CONSTITUTIONAL: No fever, weight loss, or fatigue  EYES: No eye pain, visual disturbances, or discharge  ENMT:  No difficulty hearing, tinnitus, vertigo; No sinus or throat pain  NECK: No pain or stiffness  BREASTS: No pain, masses, or nipple discharge  RESPIRATORY: No cough, wheezing, chills or hemoptysis; No shortness of breath  CARDIOVASCULAR: No chest pain, palpitations, dizziness, or leg swelling  GASTROINTESTINAL: No abdominal or epigastric pain. No nausea, vomiting, or hematemesis; No diarrhea or constipation. No melena or hematochezia.  GENITOURINARY: No dysuria, frequency, hematuria, or incontinence  NEUROLOGICAL: No headaches, memory loss, loss of strength, numbness, or tremors  SKIN: No itching, burning, rashes, or lesions   LYMPH NODES: No enlarged glands  ENDOCRINE: No heat or cold intolerance; No hair loss  MUSCULOSKELETAL: No joint pain or swelling; No muscle, back, or extremity pain  PSYCHIATRIC: No depression, anxiety, mood swings, or difficulty sleeping  HEME/LYMPH: No easy bruising, or bleeding gums  ALLERGY AND IMMUNOLOGIC: No hives or eczema    Vital Signs Last 24 Hrs  T(C): 36.5 (2018 05:15), Max: 37.6 (2018 17:33)  T(F): 97.7 (2018 05:15), Max: 99.6 (2018 17:33)  HR: 56 (2018 05:15) (56 - 68)  BP: 121/74 (2018 05:15) (107/52 - 130/61)  BP(mean): --  RR: 18 (2018 05:15) (17 - 18)  SpO2: 97% (2018 05:15) (97% - 98%)    PHYSICAL EXAM:  GENERAL: NAD, well-groomed, well-developed  HEAD:  Atraumatic, Normocephalic  EYES: EOMI, PERRL, conjunctiva and sclera clear  ENMT:  Moist mucous membranes, Good dentition, No lesions  NECK: Supple, No JVD, Normal thyroid  NERVOUS SYSTEM:  Alert & Oriented X 3, Good concentration; Motor Strength 4/5 B/L upper and lower extremities; DTRs 2+ intact and symmetric  CHEST/LUNG: Clear to percussion bilaterally; No rales, rhonchi, wheezing, or rubs  HEART: Regular rate and rhythm; No murmurs, rubs, or gallops  ABDOMEN: Soft, Nontender, Nondistended; Bowel sounds present  EXTREMITIES:  2+ Peripheral Pulses, No clubbing, cyanosis, or edema  LYMPH: No lymphadenopathy noted  SKIN: No rashes or lesions    LABS:                        9.2    6.54  )-----------( 273      ( 2018 06:21 )             31.1     11-26    147<H>  |  115<H>  |  42<H>  ----------------------------<  74  4.9   |  26  |  1.83<H>    Ca    8.4<L>      2018 06:21    TPro  6.7  /  Alb  2.0<L>  /  TBili  0.2  /  DBili  x   /  AST  17  /  ALT  19  /  AlkPhos  59  -    PT/INR - ( 2018 14:27 )   PT: 13.0 sec;   INR: 1.16 ratio         PTT - ( 2018 14:27 )  PTT:31.8 sec  Urinalysis Basic - ( 2018 16:30 )    Color: Yellow / Appearance: Clear / S.015 / pH: x  Gluc: x / Ketone: Negative  / Bili: Negative / Urobili: Negative mg/dL   Blood: x / Protein: 15 mg/dL / Nitrite: Negative   Leuk Esterase: Trace / RBC: x / WBC 3-5   Sq Epi: x / Non Sq Epi: Occasional / Bacteria: Few      CAPILLARY BLOOD GLUCOSE      POCT Blood Glucose.: 138 mg/dL (2018 11:05)  POCT Blood Glucose.: 84 mg/dL (2018 07:53)  POCT Blood Glucose.: 123 mg/dL (2018 21:31)  POCT Blood Glucose.: 111 mg/dL (2018 16:08)          Hemoglobin A1C, Whole Blood: 6.2 % ( @ 07:51)        Culture - Urine (collected 18 @ 20:03)  Source: .Urine Clean Catch (Midstream)  Final Report (18 @ 18:02):    10,000 - 49,000 CFU/mL Coag Negative Staphylococcus    Normal Urogenital dianelys present          RADIOLOGY & ADDITIONAL TESTS:  < from: CT Head No Cont (18 @ 15:12) >  Impression: No evidence of acute hemorrhage, mass or mass effect.    Small focus of high attenuation is seen anterior to the right globe as   described above.    < end of copied text >    Imaging Personally Reviewed:  [ ] YES  [ ] NO    Consultant(s) Notes Reviewed:  [ ] YES  [ ] NO    Care Discussed with Consultants/Other Providers [ ] YES  [ ] NO    PROBLEMS:  HYPOGLYCEMIA, ARTHRITIS  AMS LOW BLOOD SUGAR  Hypoglycemia  Dementia without behavioral disturbance, unspecified dementia type  Anemia due to stage 3 chronic kidney disease  CKD (chronic kidney disease) stage 3, GFR 30-59 ml/min  Legally blind  Type 2 diabetes mellitus with hyperglycemia, without long-term current use of insulin  Essential hypertension  Transient alteration of awareness  Hypoglycemia      Care discussed with family,         [  ]   yes  [  ]  No    imp:    stable[ ]    unstable[  ]     improving [   ]       unchanged  [  ]                Plans:  Continue present plans  [  ]               New consult [  ]   specialty  .......               order chad[  ]    test name.                  Discharge Planning  [  ]

## 2018-11-28 ENCOUNTER — TRANSCRIPTION ENCOUNTER (OUTPATIENT)
Age: 83
End: 2018-11-28

## 2018-11-28 VITALS
DIASTOLIC BLOOD PRESSURE: 70 MMHG | RESPIRATION RATE: 18 BRPM | HEART RATE: 60 BPM | OXYGEN SATURATION: 100 % | SYSTOLIC BLOOD PRESSURE: 142 MMHG | TEMPERATURE: 98 F

## 2018-11-28 LAB
ANION GAP SERPL CALC-SCNC: 8 MMOL/L — SIGNIFICANT CHANGE UP (ref 5–17)
BUN SERPL-MCNC: 50 MG/DL — HIGH (ref 7–23)
CALCIUM SERPL-MCNC: 8.2 MG/DL — LOW (ref 8.5–10.1)
CHLORIDE SERPL-SCNC: 112 MMOL/L — HIGH (ref 96–108)
CO2 SERPL-SCNC: 23 MMOL/L — SIGNIFICANT CHANGE UP (ref 22–31)
CREAT SERPL-MCNC: 2.06 MG/DL — HIGH (ref 0.5–1.3)
GLUCOSE BLDC GLUCOMTR-MCNC: 110 MG/DL — HIGH (ref 70–99)
GLUCOSE BLDC GLUCOMTR-MCNC: 119 MG/DL — HIGH (ref 70–99)
GLUCOSE BLDC GLUCOMTR-MCNC: 213 MG/DL — HIGH (ref 70–99)
GLUCOSE SERPL-MCNC: 108 MG/DL — HIGH (ref 70–99)
POTASSIUM SERPL-MCNC: 4.6 MMOL/L — SIGNIFICANT CHANGE UP (ref 3.5–5.3)
POTASSIUM SERPL-SCNC: 4.6 MMOL/L — SIGNIFICANT CHANGE UP (ref 3.5–5.3)
SODIUM SERPL-SCNC: 143 MMOL/L — SIGNIFICANT CHANGE UP (ref 135–145)

## 2018-11-28 PROCEDURE — 76775 US EXAM ABDO BACK WALL LIM: CPT | Mod: 26

## 2018-11-28 RX ADMIN — FINASTERIDE 5 MILLIGRAM(S): 5 TABLET, FILM COATED ORAL at 11:34

## 2018-11-28 RX ADMIN — HEPARIN SODIUM 5000 UNIT(S): 5000 INJECTION INTRAVENOUS; SUBCUTANEOUS at 06:17

## 2018-11-28 RX ADMIN — Medication 1 TABLET(S): at 11:34

## 2018-11-28 RX ADMIN — CALCITRIOL 0.25 MICROGRAM(S): 0.5 CAPSULE ORAL at 11:34

## 2018-11-28 RX ADMIN — Medication 2: at 11:32

## 2018-11-28 RX ADMIN — ESCITALOPRAM OXALATE 10 MILLIGRAM(S): 10 TABLET, FILM COATED ORAL at 11:34

## 2018-11-28 RX ADMIN — CLOPIDOGREL BISULFATE 75 MILLIGRAM(S): 75 TABLET, FILM COATED ORAL at 11:34

## 2018-11-28 RX ADMIN — SODIUM CHLORIDE 70 MILLILITER(S): 9 INJECTION INTRAMUSCULAR; INTRAVENOUS; SUBCUTANEOUS at 11:34

## 2018-11-28 RX ADMIN — HEPARIN SODIUM 5000 UNIT(S): 5000 INJECTION INTRAVENOUS; SUBCUTANEOUS at 17:08

## 2018-11-28 NOTE — DISCHARGE NOTE ADULT - PLAN OF CARE
blood sugar within normal range check blood sugar before meals and at bedtime  f/u with pmd cont home meds  f/u pmd cont home meds 2/2 hypoglycemia. Resolved.

## 2018-11-28 NOTE — DISCHARGE NOTE ADULT - PATIENT PORTAL LINK FT
You can access the [facility] Patient Portal, offered by [facility], by registering with the following website: [email or web address]

## 2018-11-28 NOTE — DISCHARGE NOTE ADULT - MEDICATION SUMMARY - MEDICATIONS TO TAKE
I will START or STAY ON the medications listed below when I get home from the hospital:    finasteride 5 mg oral tablet  -- 1 tab(s) by mouth once a day  -- Indication: For Benign prostatic hyperplasia with lower urinary tract symptoms, symptom details unspecified    doxazosin 1 mg oral tablet  -- 1 tab(s) by mouth once a day  -- Indication: For Benign prostatic hyperplasia with lower urinary tract symptoms, symptom details unspecified    escitalopram 10 mg oral tablet  -- 1 tab(s) by mouth once a day  -- Indication: For Depression    repaglinide 1 mg oral tablet  -- 1 tab(s) by mouth 3 times a day (before meals)  -- Indication: For DM (diabetes mellitus)    Tradjenta 5 mg oral tablet  -- 1 tab(s) by mouth once a day  -- Indication: For DM (diabetes mellitus)    atorvastatin 80 mg oral tablet  -- orally once a day (at bedtime)  -- Indication: For Hld    clopidogrel 75 mg oral tablet  -- 1 tab(s) by mouth once a day  -- Indication: For AC    Metoprolol Succinate ER 50 mg oral tablet, extended release  -- 1 tab(s) by mouth once a day  -- Indication: For HTN (hypertension)    donepezil 10 mg oral tablet  -- 1 tab(s) by mouth once a day (at bedtime)  -- Indication: For Dementia without behavioral disturbance, unspecified dementia type    Melatonin 5 mg oral tablet  -- 1 tab(s) by mouth once a day (at bedtime)  -- Indication: For insomnia    esomeprazole 40 mg oral delayed release capsule  -- 1 cap(s) by mouth once a day  -- Indication: For Gerd    Nephro-Nima oral tablet  -- 1 tab(s) by mouth once a day  -- Indication: For Supplement    calcitriol 0.25 mcg oral capsule  -- orally 3 times a week  -- Indication: For Supplement

## 2018-11-28 NOTE — DISCHARGE NOTE ADULT - CARE PLAN
Principal Discharge DX:	Hypoglycemia  Goal:	blood sugar within normal range  Assessment and plan of treatment:	check blood sugar before meals and at bedtime  f/u with pmd  Secondary Diagnosis:	Type 2 diabetes mellitus without complication, without long-term current use of insulin  Assessment and plan of treatment:	cont home meds  f/u pmd  Secondary Diagnosis:	Essential hypertension  Assessment and plan of treatment:	cont home meds  Secondary Diagnosis:	Dementia without behavioral disturbance, unspecified dementia type  Assessment and plan of treatment:	cont home meds Principal Discharge DX:	Hypoglycemia  Goal:	blood sugar within normal range  Assessment and plan of treatment:	check blood sugar before meals and at bedtime  f/u with pmd  Secondary Diagnosis:	Type 2 diabetes mellitus without complication, without long-term current use of insulin  Assessment and plan of treatment:	cont home meds  f/u pmd  Secondary Diagnosis:	Essential hypertension  Assessment and plan of treatment:	cont home meds  Secondary Diagnosis:	Dementia without behavioral disturbance, unspecified dementia type  Assessment and plan of treatment:	cont home meds  Secondary Diagnosis:	Encephalopathy, metabolic  Assessment and plan of treatment:	2/2 hypoglycemia. Resolved.

## 2018-11-28 NOTE — PROGRESS NOTE ADULT - SUBJECTIVE AND OBJECTIVE BOX
Patient feels well no complaints today.    MEDICATIONS  (STANDING):  atorvastatin 80 milliGRAM(s) Oral at bedtime  calcitriol   Capsule 0.25 MICROGram(s) Oral daily  clopidogrel Tablet 75 milliGRAM(s) Oral daily  dextrose 5%. 1000 milliLiter(s) (50 mL/Hr) IV Continuous <Continuous>  dextrose 50% Injectable 12.5 Gram(s) IV Push once  dextrose 50% Injectable 25 Gram(s) IV Push once  donepezil 10 milliGRAM(s) Oral at bedtime  doxazosin 1 milliGRAM(s) Oral at bedtime  escitalopram 10 milliGRAM(s) Oral daily  finasteride 5 milliGRAM(s) Oral daily  heparin  Injectable 5000 Unit(s) SubCutaneous every 12 hours  insulin lispro (HumaLOG) corrective regimen sliding scale   SubCutaneous three times a day before meals  melatonin 6 milliGRAM(s) Oral at bedtime  metoprolol succinate ER 50 milliGRAM(s) Oral daily  multivitamin 1 Tablet(s) Oral daily  sodium chloride 0.9%. 1000 milliLiter(s) (70 mL/Hr) IV Continuous <Continuous>    MEDICATIONS  (PRN):  dextrose 40% Gel 15 Gram(s) Oral once PRN Blood Glucose LESS THAN 70 milliGRAM(s)/deciliter  glucagon  Injectable 1 milliGRAM(s) IntraMuscular once PRN Glucose LESS THAN 70 milligrams/deciliter      11-27-18 @ 07:01  -  11-28-18 @ 07:00  --------------------------------------------------------  IN: 590 mL / OUT: 850 mL / NET: -260 mL      PHYSICAL EXAM:      T(C): 36.8 (11-28-18 @ 11:04), Max: 37.3 (11-28-18 @ 00:10)  HR: 56 (11-28-18 @ 11:08) (52 - 58)  BP: 117/53 (11-28-18 @ 11:08) (111/54 - 122/54)  RR: 17 (11-28-18 @ 11:08) (17 - 20)  SpO2: 100% (11-28-18 @ 11:08) (95% - 100%)  Wt(kg): --  Respiratory: clear anteriorly, decreased BS at bases  Cardiovascular: S1 S2  Gastrointestinal: soft NT ND +BS  Extremities:   1 edema                11-28    143  |  112<H>  |  50<H>  ----------------------------<  108<H>  4.6   |  23  |  2.06<H>    Ca    8.2<L>      28 Nov 2018 07:10            Assessment and Plan:  CKD 3; HTN, DM; stable;   Renal US screen;   Will follow.

## 2018-11-28 NOTE — DISCHARGE NOTE ADULT - CARE PROVIDER_API CALL
Graciela George), Internal Medicine  [address] [place] [address]  Phone: [phone]  Fax: [phone] Rehab MD,   Phone: (   )    -  Fax: (   )    -

## 2018-11-28 NOTE — DISCHARGE NOTE ADULT - HOSPITAL COURSE
85yo male with pmh DM (repaglinide, trajenta and possibly metformin which he took this morning), HTN, CKD, dementia, presents with declining mental status in past few months, however states altered in past week with hallucinations, and urinary/fecal incontinence and nonambulatory and just lying in bed so daughter called ambulance today. Pt noted with FS of 39, given D50 and daughter reports he is better. Pt co knee pain. Pt AOx2 but can reasonably contribute to history. Pt lives home alone. Pt was independent 3 weeks ago using the bus independently.  In hospital pt seen by renal Dr. Dodson. For discharge to rehab, to f/u with pmd 85yo male with pmh DM (repaglinide, trajenta and possibly metformin which he took this morning), HTN, CKD, dementia, presents with declining mental status in past few months, however states altered in past week with hallucinations, and urinary/fecal incontinence and nonambulatory and just lying in bed so daughter called ambulance today. Pt noted with FS of 39, given D50 and daughter reports he is better. Pt co knee pain. Pt AOx2 but can reasonably contribute to history. Pt lives home alone. Pt was independent 3 weeks ago using the bus independently.  11/26/18 ; Alert, asymptomatic. renal consult called. PT evaluation.  11/27/18 : Alert, awake. Asymptomatic. PT eval noted . For STR. Renal consult noted.  11/28/18 : Asymptomatic, alert. Medically stable for discharge to rehab. F/U with PMD upon discharge from the Rehab.    Problem/Plan - 1:  ·  Problem: Hypoglycemia.  Plan: Resolved  Monitor BS.     Problem/Plan - 2:  ·  Problem: Transient alteration of awareness.  Plan: Resolved.     Problem/Plan - 3:  ·  Problem: Essential hypertension.  Plan: Monitor BP.     Problem/Plan - 4:  ·  Problem: Type 2 diabetes mellitus with hyperglycemia, without long-term current use of insulin.  Plan: RISS.     Problem/Plan - 5:  ·  Problem: Legally blind.     Problem/Plan - 6:  Problem: CKD (chronic kidney disease) stage 3, GFR 30-59 ml/min. Plan: Renal consult.    Problem/Plan - 7:  ·  Problem: Anemia due to stage 3 chronic kidney disease.     Problem/Plan - 8:  ·  Problem: Dementia without behavioral disturbance, unspecified dementia type.  Plan: Aricept.

## 2018-11-28 NOTE — DISCHARGE NOTE ADULT - SECONDARY DIAGNOSIS.
Type 2 diabetes mellitus without complication, without long-term current use of insulin Essential hypertension Dementia without behavioral disturbance, unspecified dementia type Encephalopathy, metabolic

## 2018-11-28 NOTE — DISCHARGE NOTE ADULT - OTHER SIGNIFICANT FINDINGS
< from: CT Head No Cont (11.25.18 @ 15:12) >  Impression: No evidence of acute hemorrhage, mass or mass effect.    Small focus of high attenuation is seen anterior to the right globe as   described above.    < end of copied text >  < from: Xray Knee 3 Views, Left (11.25.18 @ 15:43) >  FINDINGS AND   IMPRESSION: There is no acute fracture or dislocation.  Alignment is   anatomic. The joint spaces are preserved. There is a small suprapatellar   joint effusion. Vascular calcifications are noted.    < end of copied text >

## 2018-11-30 DIAGNOSIS — E11.649 TYPE 2 DIABETES MELLITUS WITH HYPOGLYCEMIA WITHOUT COMA: ICD-10-CM

## 2018-11-30 DIAGNOSIS — N18.3 CHRONIC KIDNEY DISEASE, STAGE 3 (MODERATE): ICD-10-CM

## 2018-11-30 DIAGNOSIS — I12.9 HYPERTENSIVE CHRONIC KIDNEY DISEASE WITH STAGE 1 THROUGH STAGE 4 CHRONIC KIDNEY DISEASE, OR UNSPECIFIED CHRONIC KIDNEY DISEASE: ICD-10-CM

## 2018-11-30 DIAGNOSIS — H40.9 UNSPECIFIED GLAUCOMA: ICD-10-CM

## 2018-11-30 DIAGNOSIS — Z79.02 LONG TERM (CURRENT) USE OF ANTITHROMBOTICS/ANTIPLATELETS: ICD-10-CM

## 2018-11-30 DIAGNOSIS — H54.8 LEGAL BLINDNESS, AS DEFINED IN USA: ICD-10-CM

## 2018-11-30 DIAGNOSIS — E11.22 TYPE 2 DIABETES MELLITUS WITH DIABETIC CHRONIC KIDNEY DISEASE: ICD-10-CM

## 2018-11-30 DIAGNOSIS — D63.1 ANEMIA IN CHRONIC KIDNEY DISEASE: ICD-10-CM

## 2018-11-30 DIAGNOSIS — G93.41 METABOLIC ENCEPHALOPATHY: ICD-10-CM

## 2018-11-30 DIAGNOSIS — F03.90 UNSPECIFIED DEMENTIA WITHOUT BEHAVIORAL DISTURBANCE: ICD-10-CM

## 2020-09-03 NOTE — DISCHARGE NOTE ADULT - VISION (WITH CORRECTIVE LENSES IF THE PATIENT USUALLY WEARS THEM):
Glasses Prescribed: Severely impaired: cannot locate objects without hearing or touching them or patient nonresponsive.

## 2021-03-20 NOTE — PATIENT PROFILE ADULT - HAVE YOU EXPERIENCED A TRAUMATIC EVENT?
Contacted patient and notified her of the negative Gonorrhea and chlamydia tests.  Patient reports improvement in her symptoms.  Recommended that she follow up with her PCP if symptoms do not resolve or go to the ER for any new or worsening symptoms or if the symptoms return.  Patient verbalized understanding.    no

## 2021-07-30 NOTE — ED ADULT NURSE NOTE - NSFALLRSKOUTCOME_ED_ALL_ED
Continue: Pred Forte (prednisolone acetate): drops,suspension: 1% 1 drop four times a day into both eyes 07- Fall Risk

## 2022-08-25 NOTE — PATIENT PROFILE ADULT - NSPROSPIRITUALVALUESFT_GEN_A_NUR
Detail Level: Simple Plan: Recommended Dr. Corbin Merrill for further evaluation Detail Level: Detailed Plan: Spot is clear will monitor Plan: Spot clear will monitor none

## 2024-01-18 ENCOUNTER — EMERGENCY (EMERGENCY)
Facility: HOSPITAL | Age: 89
LOS: 0 days | Discharge: HOME | End: 2024-01-18
Attending: STUDENT IN AN ORGANIZED HEALTH CARE EDUCATION/TRAINING PROGRAM
Payer: MEDICARE

## 2024-01-18 VITALS
TEMPERATURE: 98 F | RESPIRATION RATE: 18 BRPM | HEART RATE: 58 BPM | HEIGHT: 68 IN | SYSTOLIC BLOOD PRESSURE: 149 MMHG | DIASTOLIC BLOOD PRESSURE: 85 MMHG | OXYGEN SATURATION: 100 % | WEIGHT: 139.99 LBS

## 2024-01-18 VITALS
OXYGEN SATURATION: 95 % | HEART RATE: 62 BPM | DIASTOLIC BLOOD PRESSURE: 71 MMHG | TEMPERATURE: 97 F | RESPIRATION RATE: 19 BRPM | SYSTOLIC BLOOD PRESSURE: 149 MMHG

## 2024-01-18 DIAGNOSIS — Z79.84 LONG TERM (CURRENT) USE OF ORAL HYPOGLYCEMIC DRUGS: ICD-10-CM

## 2024-01-18 DIAGNOSIS — F03.90 UNSPECIFIED DEMENTIA WITHOUT BEHAVIORAL DISTURBANCE: ICD-10-CM

## 2024-01-18 DIAGNOSIS — N39.0 URINARY TRACT INFECTION, SITE NOT SPECIFIED: ICD-10-CM

## 2024-01-18 DIAGNOSIS — N18.9 CHRONIC KIDNEY DISEASE, UNSPECIFIED: ICD-10-CM

## 2024-01-18 DIAGNOSIS — R53.83 OTHER FATIGUE: ICD-10-CM

## 2024-01-18 DIAGNOSIS — Z98.49 CATARACT EXTRACTION STATUS, UNSPECIFIED EYE: Chronic | ICD-10-CM

## 2024-01-18 DIAGNOSIS — E11.649 TYPE 2 DIABETES MELLITUS WITH HYPOGLYCEMIA WITHOUT COMA: ICD-10-CM

## 2024-01-18 DIAGNOSIS — Z79.02 LONG TERM (CURRENT) USE OF ANTITHROMBOTICS/ANTIPLATELETS: ICD-10-CM

## 2024-01-18 DIAGNOSIS — I12.9 HYPERTENSIVE CHRONIC KIDNEY DISEASE WITH STAGE 1 THROUGH STAGE 4 CHRONIC KIDNEY DISEASE, OR UNSPECIFIED CHRONIC KIDNEY DISEASE: ICD-10-CM

## 2024-01-18 DIAGNOSIS — E11.22 TYPE 2 DIABETES MELLITUS WITH DIABETIC CHRONIC KIDNEY DISEASE: ICD-10-CM

## 2024-01-18 DIAGNOSIS — Z20.822 CONTACT WITH AND (SUSPECTED) EXPOSURE TO COVID-19: ICD-10-CM

## 2024-01-18 PROBLEM — I10 ESSENTIAL (PRIMARY) HYPERTENSION: Chronic | Status: ACTIVE | Noted: 2018-11-25

## 2024-01-18 PROBLEM — E11.9 TYPE 2 DIABETES MELLITUS WITHOUT COMPLICATIONS: Chronic | Status: ACTIVE | Noted: 2018-11-25

## 2024-01-18 PROBLEM — N18.3 CHRONIC KIDNEY DISEASE, STAGE 3 (MODERATE): Chronic | Status: ACTIVE | Noted: 2018-11-25

## 2024-01-18 PROBLEM — H40.9 UNSPECIFIED GLAUCOMA: Chronic | Status: ACTIVE | Noted: 2018-11-25

## 2024-01-18 PROBLEM — H54.8 LEGAL BLINDNESS, AS DEFINED IN USA: Chronic | Status: ACTIVE | Noted: 2018-11-25

## 2024-01-18 PROBLEM — N40.1 BENIGN PROSTATIC HYPERPLASIA WITH LOWER URINARY TRACT SYMPTOMS: Chronic | Status: ACTIVE | Noted: 2018-11-25

## 2024-01-18 LAB
ALBUMIN SERPL ELPH-MCNC: 2.6 G/DL — LOW (ref 3.3–5)
ALP SERPL-CCNC: 76 U/L — SIGNIFICANT CHANGE UP (ref 40–120)
ALT FLD-CCNC: 12 U/L — SIGNIFICANT CHANGE UP (ref 12–78)
ANION GAP SERPL CALC-SCNC: 7 MMOL/L — SIGNIFICANT CHANGE UP (ref 5–17)
APPEARANCE UR: ABNORMAL
AST SERPL-CCNC: 19 U/L — SIGNIFICANT CHANGE UP (ref 15–37)
BACTERIA # UR AUTO: ABNORMAL /HPF
BASOPHILS # BLD AUTO: 0.02 K/UL — SIGNIFICANT CHANGE UP (ref 0–0.2)
BASOPHILS NFR BLD AUTO: 0.3 % — SIGNIFICANT CHANGE UP (ref 0–2)
BILIRUB SERPL-MCNC: 0.7 MG/DL — SIGNIFICANT CHANGE UP (ref 0.2–1.2)
BILIRUB UR-MCNC: NEGATIVE — SIGNIFICANT CHANGE UP
BUN SERPL-MCNC: 20 MG/DL — SIGNIFICANT CHANGE UP (ref 7–23)
CALCIUM SERPL-MCNC: 9.1 MG/DL — SIGNIFICANT CHANGE UP (ref 8.5–10.1)
CHLORIDE SERPL-SCNC: 110 MMOL/L — HIGH (ref 96–108)
CO2 SERPL-SCNC: 24 MMOL/L — SIGNIFICANT CHANGE UP (ref 22–31)
COLOR SPEC: YELLOW — SIGNIFICANT CHANGE UP
CREAT SERPL-MCNC: 1.56 MG/DL — HIGH (ref 0.5–1.3)
DIFF PNL FLD: ABNORMAL
EGFR: 42 ML/MIN/1.73M2 — LOW
EOSINOPHIL # BLD AUTO: 0.06 K/UL — SIGNIFICANT CHANGE UP (ref 0–0.5)
EOSINOPHIL NFR BLD AUTO: 0.8 % — SIGNIFICANT CHANGE UP (ref 0–6)
EPI CELLS # UR: SIGNIFICANT CHANGE UP
GLUCOSE SERPL-MCNC: 214 MG/DL — HIGH (ref 70–99)
GLUCOSE UR QL: 250 MG/DL
HCT VFR BLD CALC: 32.7 % — LOW (ref 39–50)
HGB BLD-MCNC: 9.8 G/DL — LOW (ref 13–17)
IMM GRANULOCYTES NFR BLD AUTO: 0.4 % — SIGNIFICANT CHANGE UP (ref 0–0.9)
KETONES UR-MCNC: NEGATIVE MG/DL — SIGNIFICANT CHANGE UP
LEUKOCYTE ESTERASE UR-ACNC: ABNORMAL
LYMPHOCYTES # BLD AUTO: 1.15 K/UL — SIGNIFICANT CHANGE UP (ref 1–3.3)
LYMPHOCYTES # BLD AUTO: 15.9 % — SIGNIFICANT CHANGE UP (ref 13–44)
MAGNESIUM SERPL-MCNC: 1.6 MG/DL — SIGNIFICANT CHANGE UP (ref 1.6–2.6)
MCHC RBC-ENTMCNC: 22.6 PG — LOW (ref 27–34)
MCHC RBC-ENTMCNC: 30 G/DL — LOW (ref 32–36)
MCV RBC AUTO: 75.5 FL — LOW (ref 80–100)
MONOCYTES # BLD AUTO: 0.41 K/UL — SIGNIFICANT CHANGE UP (ref 0–0.9)
MONOCYTES NFR BLD AUTO: 5.7 % — SIGNIFICANT CHANGE UP (ref 2–14)
NEUTROPHILS # BLD AUTO: 5.56 K/UL — SIGNIFICANT CHANGE UP (ref 1.8–7.4)
NEUTROPHILS NFR BLD AUTO: 76.9 % — SIGNIFICANT CHANGE UP (ref 43–77)
NITRITE UR-MCNC: POSITIVE
NRBC # BLD: 0 /100 WBCS — SIGNIFICANT CHANGE UP (ref 0–0)
PH UR: 5.5 — SIGNIFICANT CHANGE UP (ref 5–8)
PLATELET # BLD AUTO: 309 K/UL — SIGNIFICANT CHANGE UP (ref 150–400)
POTASSIUM SERPL-MCNC: 4.2 MMOL/L — SIGNIFICANT CHANGE UP (ref 3.5–5.3)
POTASSIUM SERPL-SCNC: 4.2 MMOL/L — SIGNIFICANT CHANGE UP (ref 3.5–5.3)
PROT SERPL-MCNC: 7 GM/DL — SIGNIFICANT CHANGE UP (ref 6–8.3)
PROT UR-MCNC: 30 MG/DL
RAPID RVP RESULT: SIGNIFICANT CHANGE UP
RBC # BLD: 4.33 M/UL — SIGNIFICANT CHANGE UP (ref 4.2–5.8)
RBC # FLD: 21 % — HIGH (ref 10.3–14.5)
RBC CASTS # UR COMP ASSIST: SIGNIFICANT CHANGE UP /HPF (ref 0–4)
SARS-COV-2 RNA SPEC QL NAA+PROBE: SIGNIFICANT CHANGE UP
SODIUM SERPL-SCNC: 141 MMOL/L — SIGNIFICANT CHANGE UP (ref 135–145)
SP GR SPEC: 1.02 — SIGNIFICANT CHANGE UP (ref 1–1.03)
UROBILINOGEN FLD QL: 0.2 MG/DL — SIGNIFICANT CHANGE UP (ref 0.2–1)
WBC # BLD: 7.23 K/UL — SIGNIFICANT CHANGE UP (ref 3.8–10.5)
WBC # FLD AUTO: 7.23 K/UL — SIGNIFICANT CHANGE UP (ref 3.8–10.5)
WBC UR QL: >50 /HPF — SIGNIFICANT CHANGE UP (ref 0–5)

## 2024-01-18 PROCEDURE — 71045 X-RAY EXAM CHEST 1 VIEW: CPT | Mod: 26

## 2024-01-18 PROCEDURE — 93010 ELECTROCARDIOGRAM REPORT: CPT

## 2024-01-18 PROCEDURE — 99285 EMERGENCY DEPT VISIT HI MDM: CPT | Mod: FS

## 2024-01-18 RX ORDER — DONEPEZIL HYDROCHLORIDE 10 MG/1
1 TABLET, FILM COATED ORAL
Qty: 0 | Refills: 0 | DISCHARGE

## 2024-01-18 RX ORDER — CEFTRIAXONE 500 MG/1
1000 INJECTION, POWDER, FOR SOLUTION INTRAMUSCULAR; INTRAVENOUS ONCE
Refills: 0 | Status: COMPLETED | OUTPATIENT
Start: 2024-01-18 | End: 2024-01-18

## 2024-01-18 RX ORDER — CALCITRIOL 0.5 UG/1
0 CAPSULE ORAL
Qty: 0 | Refills: 0 | DISCHARGE

## 2024-01-18 RX ORDER — CEFDINIR 250 MG/5ML
1 POWDER, FOR SUSPENSION ORAL
Qty: 21 | Refills: 0
Start: 2024-01-18 | End: 2024-01-24

## 2024-01-18 RX ORDER — ATORVASTATIN CALCIUM 80 MG/1
0 TABLET, FILM COATED ORAL
Qty: 0 | Refills: 0 | DISCHARGE

## 2024-01-18 RX ORDER — CLOPIDOGREL BISULFATE 75 MG/1
1 TABLET, FILM COATED ORAL
Qty: 0 | Refills: 0 | DISCHARGE

## 2024-01-18 RX ORDER — DOXAZOSIN MESYLATE 4 MG
1 TABLET ORAL
Qty: 0 | Refills: 0 | DISCHARGE

## 2024-01-18 RX ADMIN — CEFTRIAXONE 100 MILLIGRAM(S): 500 INJECTION, POWDER, FOR SOLUTION INTRAMUSCULAR; INTRAVENOUS at 14:36

## 2024-01-18 NOTE — ED ADULT NURSE NOTE - OBJECTIVE STATEMENT
Pt A+Ox2, disoriented to situation was found unresponsive by home health aide, called 911. enroute, EMS placed an 18 to R hand, administered 300ml of D10. no complaints reported, no signs of distress noted. As per aide, patient is confused at baseline and at baseline Pt A+Ox2, disoriented to situation was found unresponsive by home health aide, called 911. enroute, EMS placed an 18 to R hand, administered 300ml of D10. no complaints reported, no signs of distress noted. aide states patient has had congestion for 2 days. As per aide, patient is confused at baseline and at baseline.

## 2024-01-18 NOTE — ED ADULT NURSE NOTE - NSFALLHARMRISKINTERV_ED_ALL_ED

## 2024-01-18 NOTE — ED PROVIDER NOTE - CONSIDERATION OF ADMISSION OBSERVATION
Consideration of Admission/Observation considered admission for hypoglycemia and UTI. glucose controlled and patient tolerating meals. safe for dc w/ abx

## 2024-01-18 NOTE — ED PROVIDER NOTE - ATTENDING APP SHARED VISIT CONTRIBUTION OF CARE
88 y/o male with dm, htn, high, ckd, dementia brought in by ems for hypoglycemia today.    hypoglycemia in setting of uti. patient with dementia but at baseline and tolerating meals. patient safe for discharge with home health aid.

## 2024-01-18 NOTE — ED PROVIDER NOTE - NSICDXPASTMEDICALHX_GEN_ALL_CORE_FT
PAST MEDICAL HISTORY:  Anemia due to stage 3 chronic kidney disease     Benign prostatic hyperplasia with lower urinary tract symptoms, symptom details unspecified     CKD (chronic kidney disease) stage 3, GFR 30-59 ml/min     Dementia     DM (diabetes mellitus)     Glaucoma     HTN (hypertension)     Legally blind Both Eyes

## 2024-01-18 NOTE — ED ADULT TRIAGE NOTE - CHIEF COMPLAINT QUOTE
biba from home with hypoglycemia upon ems arrival pt was lethargic and fs=50 d10 300 ml ivf given per ems with improvement pt awake alert and responsive at time of triage oriented to person baseline confused per ems and home aide, aide states pt at baseline at present didn't eat anything today

## 2024-01-18 NOTE — ED PROVIDER NOTE - OBJECTIVE STATEMENT
89 y/o male with dm, htn, high, ckd, dementia brought in by ems for hypoglycemia today. As per aide pt was lethargic and not really responding when she went over today. Tried to feed pt but she was not swallowing. Upon EMS arrival fs was 50. Pt was give d10 and few minutes later pt woke up and currently at his baseline. Aide states pt has been having congestion for the last few days. Denies fever, vomiting, diarrhea, recent illnesses. Pt is on oral DM medications. Pt currently alert and oriented x 2 and denies any complaints.

## 2024-01-18 NOTE — ED PROVIDER NOTE - PHYSICAL EXAMINATION
GEN: Awake, alert, interactive, NAD.  HEAD AND NECK: NC/AT. Airway patent. Neck supple.   EYES:  Clear b/l.    ENT: Moist mucus membranes.   CARDIAC: Regular rate, regular rhythm. No evident pedal edema.    RESP/CHEST: Normal respiratory effort with no use of accessory muscles or retractions. Clear throughout on auscultation.  ABD: soft, non-distended, non-tender. No rebound, no guarding.   BACK: No midline spinal TTP. No CVAT.   EXTREMITIES: Moving all extremities with no apparent deformities.   SKIN: Warm, dry, intact normal color. No rash.   NEURO: AOx2, no focal deficits.   PSYCH: Appropriate mood and affect.

## 2024-01-18 NOTE — ED ADULT NURSE NOTE - CADM POA URETHRAL CATHETER
No Spoke with pt and has stated she wanted a same day ricky, with you but we were fully booked, so I offered her an ricky, with someone else in our facility and she accepted. Has ricky, today with Dr. Johnson at 2 pm.

## 2024-01-18 NOTE — ED PROVIDER NOTE - NSFOLLOWUPINSTRUCTIONS_ED_ALL_ED_FT
Rest, drink plenty of fluids.  Advance activity as tolerated.  Continue all previously prescribed medications as directed.  Follow up with your primary care physician in 48-72 hours- bring copies of your results.  Return to the ER for worsening or persistent symptoms, and/or ANY NEW OR CONCERNING SYMPTOMS. If you have issues obtaining follow up, please call: [phone] to obtain a doctor or specialist who takes your insurance in your area.  You may call [phone] to make an appointment with the internal medicine clinic.

## 2024-01-18 NOTE — ED PROVIDER NOTE - PATIENT PORTAL LINK FT
You can access the FollowMyHealth Patient Portal offered by [facility] by registering at the following website: [email or web address]. By joining [email or web address] [email or web address] portal, you will also be able to view your health information using other applications (apps) compatible with our system.

## 2024-01-18 NOTE — ED PROVIDER NOTE - CLINICAL SUMMARY MEDICAL DECISION MAKING FREE TEXT BOX
89 y/o male with ckd, dm, htn, dementia here with hypoglycemia today given d10 by EMS. Currently back to baseline Fs 130s here.   Will check basic labs, RVP, UA, ekg, cxr r/o PNA and reassess. 89 y/o male with ckd, dm, htn, dementia here with hypoglycemia today given d10 by EMS. Currently back to baseline Fs 130s here.   Will check basic labs, RVP, UA, ekg, cxr r/o PNA and reassess.    labs reviewed and not actionable. UA positive for UTI. Will treat with Rocephin. cxr negative for pna.   glucose 214 on the cmp.   Pt reassessed and NAD. Repeat fs 160. Pt tolerated po in the ED , NAD.   WIll discharge pt home with HHA and follow up with PMD.   Rx cefdinir sent to pharmacy.

## 2024-02-04 ENCOUNTER — INPATIENT (INPATIENT)
Facility: HOSPITAL | Age: 89
LOS: 2 days | Discharge: NURSING, REHAB OR LONG-TERM CARE | End: 2024-02-07
Attending: INTERNAL MEDICINE | Admitting: INTERNAL MEDICINE
Payer: MEDICARE

## 2024-02-04 VITALS
RESPIRATION RATE: 19 BRPM | DIASTOLIC BLOOD PRESSURE: 63 MMHG | WEIGHT: 130.07 LBS | OXYGEN SATURATION: 99 % | TEMPERATURE: 98 F | SYSTOLIC BLOOD PRESSURE: 121 MMHG | HEART RATE: 70 BPM | HEIGHT: 68 IN

## 2024-02-04 DIAGNOSIS — Z98.49 CATARACT EXTRACTION STATUS, UNSPECIFIED EYE: Chronic | ICD-10-CM

## 2024-02-04 LAB
ALBUMIN SERPL ELPH-MCNC: 2 G/DL — LOW (ref 3.3–5)
ALP SERPL-CCNC: 69 U/L — SIGNIFICANT CHANGE UP (ref 40–120)
ALT FLD-CCNC: 13 U/L — SIGNIFICANT CHANGE UP (ref 12–78)
ANION GAP SERPL CALC-SCNC: 4 MMOL/L — LOW (ref 5–17)
APPEARANCE UR: ABNORMAL
AST SERPL-CCNC: 18 U/L — SIGNIFICANT CHANGE UP (ref 15–37)
BASOPHILS # BLD AUTO: 0.02 K/UL — SIGNIFICANT CHANGE UP (ref 0–0.2)
BASOPHILS NFR BLD AUTO: 0.4 % — SIGNIFICANT CHANGE UP (ref 0–2)
BILIRUB SERPL-MCNC: 0.4 MG/DL — SIGNIFICANT CHANGE UP (ref 0.2–1.2)
BILIRUB UR-MCNC: NEGATIVE — SIGNIFICANT CHANGE UP
BUN SERPL-MCNC: 19 MG/DL — SIGNIFICANT CHANGE UP (ref 7–23)
CALCIUM SERPL-MCNC: 8.8 MG/DL — SIGNIFICANT CHANGE UP (ref 8.5–10.1)
CHLORIDE SERPL-SCNC: 109 MMOL/L — HIGH (ref 96–108)
CO2 SERPL-SCNC: 27 MMOL/L — SIGNIFICANT CHANGE UP (ref 22–31)
COLOR SPEC: YELLOW — SIGNIFICANT CHANGE UP
CREAT SERPL-MCNC: 1.39 MG/DL — HIGH (ref 0.5–1.3)
DIFF PNL FLD: ABNORMAL
EGFR: 48 ML/MIN/1.73M2 — LOW
EOSINOPHIL # BLD AUTO: 0.09 K/UL — SIGNIFICANT CHANGE UP (ref 0–0.5)
EOSINOPHIL NFR BLD AUTO: 1.9 % — SIGNIFICANT CHANGE UP (ref 0–6)
GLUCOSE SERPL-MCNC: 76 MG/DL — SIGNIFICANT CHANGE UP (ref 70–99)
GLUCOSE UR QL: NEGATIVE MG/DL — SIGNIFICANT CHANGE UP
HCT VFR BLD CALC: 27.4 % — LOW (ref 39–50)
HGB BLD-MCNC: 8.2 G/DL — LOW (ref 13–17)
IMM GRANULOCYTES NFR BLD AUTO: 0.4 % — SIGNIFICANT CHANGE UP (ref 0–0.9)
KETONES UR-MCNC: NEGATIVE MG/DL — SIGNIFICANT CHANGE UP
LEUKOCYTE ESTERASE UR-ACNC: ABNORMAL
LYMPHOCYTES # BLD AUTO: 1.15 K/UL — SIGNIFICANT CHANGE UP (ref 1–3.3)
LYMPHOCYTES # BLD AUTO: 24 % — SIGNIFICANT CHANGE UP (ref 13–44)
MCHC RBC-ENTMCNC: 22.5 PG — LOW (ref 27–34)
MCHC RBC-ENTMCNC: 29.9 G/DL — LOW (ref 32–36)
MCV RBC AUTO: 75.3 FL — LOW (ref 80–100)
MONOCYTES # BLD AUTO: 0.45 K/UL — SIGNIFICANT CHANGE UP (ref 0–0.9)
MONOCYTES NFR BLD AUTO: 9.4 % — SIGNIFICANT CHANGE UP (ref 2–14)
NEUTROPHILS # BLD AUTO: 3.06 K/UL — SIGNIFICANT CHANGE UP (ref 1.8–7.4)
NEUTROPHILS NFR BLD AUTO: 63.9 % — SIGNIFICANT CHANGE UP (ref 43–77)
NITRITE UR-MCNC: NEGATIVE — SIGNIFICANT CHANGE UP
NRBC # BLD: 0 /100 WBCS — SIGNIFICANT CHANGE UP (ref 0–0)
PH UR: 6 — SIGNIFICANT CHANGE UP (ref 5–8)
PLATELET # BLD AUTO: 300 K/UL — SIGNIFICANT CHANGE UP (ref 150–400)
POTASSIUM SERPL-MCNC: 3.8 MMOL/L — SIGNIFICANT CHANGE UP (ref 3.5–5.3)
POTASSIUM SERPL-SCNC: 3.8 MMOL/L — SIGNIFICANT CHANGE UP (ref 3.5–5.3)
PROT SERPL-MCNC: 5.8 GM/DL — LOW (ref 6–8.3)
PROT UR-MCNC: 30 MG/DL
RBC # BLD: 3.64 M/UL — LOW (ref 4.2–5.8)
RBC # FLD: 19.9 % — HIGH (ref 10.3–14.5)
RBC CASTS # UR COMP ASSIST: SIGNIFICANT CHANGE UP /HPF (ref 0–4)
SODIUM SERPL-SCNC: 140 MMOL/L — SIGNIFICANT CHANGE UP (ref 135–145)
SP GR SPEC: 1.02 — SIGNIFICANT CHANGE UP (ref 1–1.03)
T3 SERPL-MCNC: 66 NG/DL — LOW (ref 80–200)
T4 AB SER-ACNC: 5.2 UG/DL — SIGNIFICANT CHANGE UP (ref 4.6–12)
TSH SERPL-MCNC: 3.33 UU/ML — SIGNIFICANT CHANGE UP (ref 0.36–3.74)
UROBILINOGEN FLD QL: 0.2 MG/DL — SIGNIFICANT CHANGE UP (ref 0.2–1)
WBC # BLD: 4.79 K/UL — SIGNIFICANT CHANGE UP (ref 3.8–10.5)
WBC # FLD AUTO: 4.79 K/UL — SIGNIFICANT CHANGE UP (ref 3.8–10.5)
WBC UR QL: SIGNIFICANT CHANGE UP /HPF (ref 0–5)

## 2024-02-04 PROCEDURE — 70450 CT HEAD/BRAIN W/O DYE: CPT | Mod: 26

## 2024-02-04 PROCEDURE — 71045 X-RAY EXAM CHEST 1 VIEW: CPT | Mod: 26

## 2024-02-04 PROCEDURE — 99285 EMERGENCY DEPT VISIT HI MDM: CPT

## 2024-02-04 RX ORDER — DONEPEZIL HYDROCHLORIDE 10 MG/1
10 TABLET, FILM COATED ORAL AT BEDTIME
Refills: 0 | Status: DISCONTINUED | OUTPATIENT
Start: 2024-02-04 | End: 2024-02-07

## 2024-02-04 RX ORDER — METOPROLOL TARTRATE 50 MG
50 TABLET ORAL DAILY
Refills: 0 | Status: DISCONTINUED | OUTPATIENT
Start: 2024-02-04 | End: 2024-02-07

## 2024-02-04 RX ORDER — PANTOPRAZOLE SODIUM 20 MG/1
40 TABLET, DELAYED RELEASE ORAL
Refills: 0 | Status: DISCONTINUED | OUTPATIENT
Start: 2024-02-04 | End: 2024-02-07

## 2024-02-04 RX ORDER — SODIUM CHLORIDE 9 MG/ML
1000 INJECTION, SOLUTION INTRAVENOUS
Refills: 0 | Status: DISCONTINUED | OUTPATIENT
Start: 2024-02-04 | End: 2024-02-07

## 2024-02-04 RX ORDER — GLUCAGON INJECTION, SOLUTION 0.5 MG/.1ML
1 INJECTION, SOLUTION SUBCUTANEOUS ONCE
Refills: 0 | Status: COMPLETED | OUTPATIENT
Start: 2024-02-04 | End: 2025-01-02

## 2024-02-04 RX ORDER — ESCITALOPRAM OXALATE 10 MG/1
10 TABLET, FILM COATED ORAL DAILY
Refills: 0 | Status: DISCONTINUED | OUTPATIENT
Start: 2024-02-04 | End: 2024-02-07

## 2024-02-04 RX ORDER — LANOLIN ALCOHOL/MO/W.PET/CERES
3 CREAM (GRAM) TOPICAL AT BEDTIME
Refills: 0 | Status: DISCONTINUED | OUTPATIENT
Start: 2024-02-04 | End: 2024-02-07

## 2024-02-04 RX ORDER — DEXTROSE 50 % IN WATER 50 %
12.5 SYRINGE (ML) INTRAVENOUS ONCE
Refills: 0 | Status: DISCONTINUED | OUTPATIENT
Start: 2024-02-04 | End: 2024-02-07

## 2024-02-04 RX ORDER — LANOLIN ALCOHOL/MO/W.PET/CERES
5 CREAM (GRAM) TOPICAL AT BEDTIME
Refills: 0 | Status: DISCONTINUED | OUTPATIENT
Start: 2024-02-04 | End: 2024-02-04

## 2024-02-04 RX ORDER — FINASTERIDE 5 MG/1
5 TABLET, FILM COATED ORAL DAILY
Refills: 0 | Status: DISCONTINUED | OUTPATIENT
Start: 2024-02-04 | End: 2024-02-07

## 2024-02-04 RX ORDER — DEXTROSE 50 % IN WATER 50 %
15 SYRINGE (ML) INTRAVENOUS ONCE
Refills: 0 | Status: DISCONTINUED | OUTPATIENT
Start: 2024-02-04 | End: 2024-02-07

## 2024-02-04 RX ORDER — CALCITRIOL 0.5 UG/1
0.25 CAPSULE ORAL DAILY
Refills: 0 | Status: DISCONTINUED | OUTPATIENT
Start: 2024-02-04 | End: 2024-02-07

## 2024-02-04 RX ORDER — DOXAZOSIN MESYLATE 4 MG
1 TABLET ORAL DAILY
Refills: 0 | Status: DISCONTINUED | OUTPATIENT
Start: 2024-02-04 | End: 2024-02-07

## 2024-02-04 RX ORDER — DEXTROSE 50 % IN WATER 50 %
25 SYRINGE (ML) INTRAVENOUS ONCE
Refills: 0 | Status: DISCONTINUED | OUTPATIENT
Start: 2024-02-04 | End: 2024-02-07

## 2024-02-04 RX ORDER — CLOPIDOGREL BISULFATE 75 MG/1
75 TABLET, FILM COATED ORAL DAILY
Refills: 0 | Status: DISCONTINUED | OUTPATIENT
Start: 2024-02-04 | End: 2024-02-04

## 2024-02-04 RX ORDER — SODIUM CHLORIDE 9 MG/ML
500 INJECTION INTRAMUSCULAR; INTRAVENOUS; SUBCUTANEOUS ONCE
Refills: 0 | Status: COMPLETED | OUTPATIENT
Start: 2024-02-04 | End: 2024-02-04

## 2024-02-04 RX ORDER — ONDANSETRON 8 MG/1
4 TABLET, FILM COATED ORAL EVERY 8 HOURS
Refills: 0 | Status: DISCONTINUED | OUTPATIENT
Start: 2024-02-04 | End: 2024-02-07

## 2024-02-04 RX ORDER — INSULIN LISPRO 100/ML
VIAL (ML) SUBCUTANEOUS
Refills: 0 | Status: DISCONTINUED | OUTPATIENT
Start: 2024-02-04 | End: 2024-02-07

## 2024-02-04 RX ADMIN — SODIUM CHLORIDE 500 MILLILITER(S): 9 INJECTION INTRAMUSCULAR; INTRAVENOUS; SUBCUTANEOUS at 09:10

## 2024-02-04 RX ADMIN — Medication 1 MILLIGRAM(S): at 13:31

## 2024-02-04 RX ADMIN — ESCITALOPRAM OXALATE 10 MILLIGRAM(S): 10 TABLET, FILM COATED ORAL at 13:31

## 2024-02-04 RX ADMIN — FINASTERIDE 5 MILLIGRAM(S): 5 TABLET, FILM COATED ORAL at 13:31

## 2024-02-04 RX ADMIN — Medication 3 MILLIGRAM(S): at 22:56

## 2024-02-04 RX ADMIN — Medication 4: at 16:01

## 2024-02-04 RX ADMIN — Medication 1 TABLET(S): at 13:31

## 2024-02-04 RX ADMIN — DONEPEZIL HYDROCHLORIDE 10 MILLIGRAM(S): 10 TABLET, FILM COATED ORAL at 22:56

## 2024-02-04 NOTE — ED PROVIDER NOTE - OBJECTIVE STATEMENT
89M PMH HTN, DM, CKD, dementia, legally blind BIBEMS d/t hypoglycemia. Per triage note, FS 40 at home, 80 on ED arrival s/p glucagon, chocolate. Pt w/o complaints.

## 2024-02-04 NOTE — H&P ADULT - NSHPPHYSICALEXAM_GEN_ALL_CORE
PHYSICAL EXAM:    GENERAL: NAD, well-groomed, well-developed  HEAD:  Atraumatic, Normocephalic  EYES: EOMI, PERRLA, conjunctiva and sclera clear  ENMT: No tonsillar erythema, exudates, or enlargement; Moist mucous membranes, , No lesions  NECK: Supple, No JVD, Normal thyroid  NERVOUS SYSTEM:  Alert & Oriented X 1, ; moves  all  CHEST/LUNG: Clear  bilaterally; No rales, rhonchi, wheezing, or rubs  HEART: Regular rate and rhythm; No murmurs, rubs, or gallops  ABDOMEN: Soft, Nontender, Nondistended; no  masses Bowel sounds present  EXTREMITIES:  + Peripheral Pulses, No clubbing, cyanosis, or edema  LYMPH: No lymphadenopathy noted   RECTAL: deferred    SKIN: No rashes or lesions

## 2024-02-04 NOTE — PATIENT PROFILE ADULT - FALL HARM RISK - FACTORS NURSING JUDGEMENT
150mcg every day. Pt is not taking Ca supplement, but she is taking an OTC Vitamin D supplements. Current Outpatient Medications   Medication Sig    clotrimazole-betamethasone (LOTRISONE) 1-0.05 % cream Apply topically 2 times daily. atorvastatin (LIPITOR) 20 MG tablet TAKE 1 TABLET BY MOUTH DAILY    albuterol sulfate HFA (PROVENTIL;VENTOLIN;PROAIR) 108 (90 Base) MCG/ACT inhaler Inhale 1 puff into the lungs as needed    ammonium lactate (AMLACTIN) 12 % cream Apply topically 2 times daily    azelastine (ASTELIN) 0.1 % nasal spray 2 sprays by Nasal route 2 times daily    ciclopirox (LOPROX) 0.77 % cream Apply topically 2 times daily    fluticasone (FLONASE) 50 MCG/ACT nasal spray PLACE 1 SPRAY IN EACH NOSTRIL EVERY DAY    levothyroxine (SYNTHROID) 150 MCG tablet Take 1 tablet by mouth every morning (before breakfast)    ranolazine (RANEXA) 500 MG extended release tablet Take 1 tablet by mouth 2 times daily     No current facility-administered medications for this visit. Allergies   Allergen Reactions    Codeine Other (See Comments)     Other reaction(s): Unknown (comments)  Ear ringing   Reaction Type: Allergy     Review of Systems:  - Cardiovascular: no chest pain  - Neurological: no tremors  - Integumentary: skin is normal    Physical Examination:  Blood pressure 122/62, pulse 62, height 6' 2\" (1.88 m), weight 253 lb (114.8 kg). General: pleasant, no distress, good eye contact   Neck: no thyromegaly or thyroid bruits  Cardiovascular: regular, normal rate, nl s1 and s2, no m/r/g   Integumentary: skin is normal, no edema  Neurological: reflexes 2+ at biceps, no tremors  Psychiatric: normal mood and affect    Data Reviewed:   Component      Latest Ref Rng & Units 5/24/2023   T4 Free      0.82 - 1.77 ng/dL 1.94 (H)   TSH      0.450 - 4.500 uIU/mL 0.007 (L)   T4, Total      4.5 - 12.0 ug/dL 12.6 (H)       Assessment/Plan:   1) Hypothyroidism > She is s/p Total thyroidectomy on 11/10/22.   Her TFTs show that Yes

## 2024-02-04 NOTE — ED PROVIDER NOTE - CLINICAL SUMMARY MEDICAL DECISION MAKING FREE TEXT BOX
89M 89M PMH HTN, DM, CKD, dementia, legally blind BIBEMS d/t hypoglycemia. Afebrile, VSS. Well appearing, in NAD. Plan for CBC, CMP, UA/C, CXR. Monitor BGL, Contact family for collateral HPI. Re-eval. Pt care signed out at change of shift. 89M PMH HTN, DM, CKD, dementia, legally blind BIBEMS d/t hypoglycemia. Afebrile, VSS. Well appearing, in NAD. Plan for CBC, CMP, UA/C, CXR. Monitor BGL, Contact family for collateral HPI. Re-eval. Pt care signed out at change of shift.    [place] - Patient with intractable nausea and vomiting, EKG completed after sinus sinus rhythm with noted PVC, QTc 494 ms, no signs of localized ischemia, troponin negative, patient denies any chest pain or shortness of breath, will admit to medicine for IV rehydration and advancement of diet until symptoms improve respiratory viral panel sent, CT abdomen pelvis without noted acute pathology of small bowel obstruction or intra-abdominal source of infection, patient with noted leukocytosis likely reactive to vomiting, patient denies any urinary symptoms, no reported fever or chills, signed out to hospitalist Dr. Arnoldo ontiveros for further management.  Will place on telemetry given hypokalemia and possibility of arrhythmias secondary to electrolyte abnormality. 89M PMH HTN, DM, CKD, dementia, legally blind BIBEMS d/t hypoglycemia. Afebrile, VSS. Well appearing, in NAD. Plan for CBC, CMP, UA/C, CXR. Monitor BGL, Contact family for collateral HPI. Re-eval. Pt care signed out at change of shift.    Patient signed out to me by Dr. Herrera patient with noted hypoglycemia, patient is neurologically intact moving all extremities but has baseline dementia, unknown if any change in mentation, patient denies any acute complaints, well-appearing noted to have anemia on blood work drop from his prior month prior, patient with noted CKD, placed CT head although no signs of external trauma, unable to obtain collateral information from family, discussed with Dr. Atkinson will admit to hospital service, appears dehydrated IV rehydration therapy advancement of diet and further clinical management per hospitalist.

## 2024-02-04 NOTE — ED ADULT NURSE NOTE - ED STAT RN HANDOFF DETAILS
PAST SURGICAL HISTORY:  S/P TAVR (transcatheter aortic valve replacement)     
Report given to Tolu JEFFERSON.  Patient resting quietly on stretcher with eyes closed.  Breathing even and unlabored.  No s/s of distress noted.

## 2024-02-04 NOTE — H&P ADULT - ASSESSMENT
IMPROVE VTE Individual Risk Assessment    RISK                                                                Points    [  ] Previous VTE                                                  3    [  ] Thrombophilia                                               2    [  ] Lower limb paralysis                                      2        (unable to hold up >15 seconds)      [  ] Current Cancer                                              2         (within 6 months)    [ x ] Immobilization > 24 hrs                                1    [  ] ICU/CCU stay > 24 hours                              1    [x  ] Age > 60                                                      1    IMPROVE VTE Score ______2___    IMPROVE Score 0-1: Low Risk, No VTE prophylaxis required for most patients, encourage ambulation.   IMPROVE Score 2-3: At risk, pharmacologic VTE prophylaxis is indicated for most patients (in the absence of a contraindication)  IMPROVE Score > or = 4: High Risk, pharmacologic VTE prophylaxis is indicated for most patients (in the absence of a contraindication)    A/P   89M PMH HTN, DM, CKD, dementia, legally blind BIBEMS d/t hypoglycemia. Per triage note, FS 40 at home, 80 on ED arrival s/p glucagon, chocolate. Pt w/o complaints. was    reported  with  nausea  vomiting  admitted  for  further w/u  and Rx  discussed  with  pt's  daughter  Niall  patient  with  functional deline over past 7 months unable to walk    # nauseaa  and  vomiting    ivf  zofran  protonix   further w/u  and Rz =x as per  clinical course    # DM/hypoglycemia  d/c  home  meds    cover  with  short  acting  insulin  as per  scale      # demetia  continue donazepil    # insomnia melatonin    # depression  anxiety   escitalopram    # BPH  finasteride    #  willD/C  plavix  and  Lipitor  discussed  with patient,'s  daughter  niall  patient  never  had   stroke  or  heart  attack or  peripheral  vascular  diease IMPROVE VTE Individual Risk Assessment    RISK                                                                Points    [  ] Previous VTE                                                  3    [  ] Thrombophilia                                               2    [  ] Lower limb paralysis                                      2        (unable to hold up >15 seconds)      [  ] Current Cancer                                              2         (within 6 months)    [ x ] Immobilization > 24 hrs                                1    [  ] ICU/CCU stay > 24 hours                              1    [x  ] Age > 60                                                      1    IMPROVE VTE Score ______2___    IMPROVE Score 0-1: Low Risk, No VTE prophylaxis required for most patients, encourage ambulation.   IMPROVE Score 2-3: At risk, pharmacologic VTE prophylaxis is indicated for most patients (in the absence of a contraindication)  IMPROVE Score > or = 4: High Risk, pharmacologic VTE prophylaxis is indicated for most patients (in the absence of a contraindication)    A/P   89M PMH HTN, DM, CKD, dementia, legally blind BIBEMS d/t hypoglycemia. Per triage note, FS 40 at home, 80 on ED arrival s/p glucagon, chocolate. Pt w/o complaints. was    reported  with  nausea  vomiting  admitted  for  further w/u  and Rx  discussed  with  pt's  daughter  Niall  patient  with  functional deline over past 7 months unable to walk    # nauseaa  and  vomiting    ivf  zofran  protonix   further w/u  and Rz =x as per  clinical course    # DM/hypoglycemia  d/c  home  meds    cover  with  short  acting  insulin  as per  scale      # dementia  continue donazepil    # insomnia melatonin    # htn  metoprolol  doxazosin    # depression  anxiety   escitalopram    # BPH  finasteride doxazosin    #  will D/C  plavix  and  Lipitor  discussed  with patient,'s  daughter  niall  patient  never  had   stroke  or  heart  attack or  peripheral  vascular  diease

## 2024-02-04 NOTE — ED ADULT NURSE NOTE - ED STAT RN HANDOFF DETAILS 2
Report given to Raimundo Jackson in ED hold. Pt is awake alert and oriented x1. No distress noted. PIV patent and intact. RN was made aware of right heel DTI and epithelized wound on sacrum.

## 2024-02-04 NOTE — ED ADULT TRIAGE NOTE - CHIEF COMPLAINT QUOTE
hx of dementia, CKD, BPH, DM, HTN , anemia   per EMS pt c/o Pain in R foot pain , EMS reports FS 40 given 1 glucagon, 2 [place] kisses. EMS report pt was aggressive , curing and bitting prior to therapy at home.

## 2024-02-04 NOTE — PATIENT PROFILE ADULT - FALL HARM RISK - HARM RISK INTERVENTIONS
Assistance with ambulation/Assistance OOB with selected safe patient handling equipment/Communicate Risk of Fall with Harm to all staff/Discuss with provider need for PT consult/Monitor gait and stability/Reinforce activity limits and safety measures with patient and family/Tailored Fall Risk Interventions/Visual Cue: Yellow wristband and red socks/Bed in lowest position, wheels locked, appropriate side rails in place/Call bell, personal items and telephone in reach/Instruct patient to call for assistance before getting out of bed or chair/Non-slip footwear when patient is out of bed/[place] to call system/Physically safe environment - no spills, clutter or unnecessary equipment/Purposeful Proactive Rounding/Room/bathroom lighting operational, light cord in reach

## 2024-02-04 NOTE — ED ADULT NURSE NOTE - CHIEF COMPLAINT QUOTE
hx of dementia, CKD, BPH, DM, HTN , anemia   per EMS pt c/o Pain in R foot pain , EMS reports FS 40 given 1 glucagon, 2 [place] kisses. EMS report pt was aggressive , curing and bitting prior to therapy at home.

## 2024-02-04 NOTE — ED ADULT NURSE NOTE - NSFALLHARMRISKINTERV_ED_ALL_ED
Assistance OOB with selected safe patient handling equipment if applicable/Assistance with ambulation/Communicate risk of Fall with Harm to all staff, patient, and family/Monitor for mental status changes and reorient to person, place, and time, as needed/Move patient closer to nursing station/within visual sight of ED staff/Provide patient with walking aids/Provide visual cue: red socks, yellow wristband, yellow gown, etc/Reinforce activity limits and safety measures with patient and family/Toileting schedule using arm’s reach rule for commode and bathroom/Use of alarms - bed, stretcher, chair and/or video monitoring/Bed in lowest position, wheels locked, appropriate side rails in place/Call bell, personal items and telephone in reach/Instruct patient to call for assistance before getting out of bed/chair/stretcher/Non-slip footwear applied when patient is off stretcher/[place] to call system/Physically safe environment - no spills, clutter or unnecessary equipment/Purposeful Proactive Rounding/Room/bathroom lighting operational, light cord in reach

## 2024-02-04 NOTE — ED PROVIDER NOTE - PHYSICAL EXAMINATION
GEN: Awake, alert, interactive, NAD.  HEAD AND NECK: NC/AT. Airway patent. Neck supple.   EYES:  Clear b/l. EOMI. PERRL.   ENT: Moist mucus membranes.   CARDIAC: Regular rate, regular rhythm. No evident pedal edema.    RESP/CHEST: Normal respiratory effort with no use of accessory muscles or retractions. Clear throughout on auscultation.  ABD: Soft, non-distended, non-tender. No rebound, no guarding.   BACK: No midline spinal TTP. No CVAT.   EXTREMITIES: Moving all extremities with no apparent deformities. BL feet in protective booties.   SKIN: Warm, dry, intact normal color. No rash.   NEURO: AOx3, CN II-XII grossly intact, no focal deficits.   PSYCH: Appropriate mood and affect.

## 2024-02-04 NOTE — H&P ADULT - NSHPLABSRESULTS_GEN_ALL_CORE
LABS:                        8.2    4.79  )-----------( 300      ( 2024 04:15 )             27.4     02-04    140  |  109<H>  |  19  ----------------------------<  76  3.8   |  27  |  1.39<H>    Ca    8.8      2024 04:15    TPro  5.8<L>  /  Alb  2.0<L>  /  TBili  0.4  /  DBili  x   /  AST  18  /  ALT  13  /  AlkPhos  69  02-04      Urinalysis Basic - ( 2024 08:15 )    Color: Yellow / Appearance: Cloudy / S.017 / pH: x  Gluc: x / Ketone: Negative mg/dL  / Bili: Negative / Urobili: 0.2 mg/dL   Blood: x / Protein: 30 mg/dL / Nitrite: Negative   Leuk Esterase: Moderate / RBC: 6-10 /HPF / WBC 6-10 /HPF   Sq Epi: x / Non Sq Epi: x / Bacteria: x

## 2024-02-04 NOTE — H&P ADULT - HISTORY OF PRESENT ILLNESS
· 89M PMH HTN, DM, CKD, dementia, legally blind BIBEMS d/t hypoglycemia. Per triage note, FS 40 at home, 80 on ED arrival s/p glucagon, chocolate. Pt w/o complaints. was    reported  with  nausea  vomiting  admitted  for  further w/u  and Rx  discussed  with  pt's  daughter  Jessica  patient  with  functional deline over past 7 months unable to walk

## 2024-02-04 NOTE — ED ADULT NURSE NOTE - NSFALLASSESSNEED_ED_ALL_ED
yes Received patient with c/o chest pain, head spinning and heaviness to head states he took some garlic with honey and the pain was resolved. Patient denies active pain at this time, no acute distress noted awaits further evaluation.

## 2024-02-04 NOTE — ED ADULT NURSE REASSESSMENT NOTE - NS ED NURSE REASSESS COMMENT FT1
Pt was cleaned and straight cath, which pt tolerated. 80ML of yellow urine was obtained. Pt noted to have deep tissue injury on right heel and heeling pressure injury on sacrum. Pt's b/l LE are in boots, pt is blind and alert to self. Pt was fed a soft diet, pt safety maintained and pt is pending further dispo.

## 2024-02-04 NOTE — ED ADULT NURSE NOTE - NSFALLDEVICES_ED_ALL_ED
Advance Care Planning   Ambulatory ACP Specialist Patient Outreach    Date:  6/21/2021    ACP Specialist:  Edy Heart RN    Outreach call to patient in follow-up to ACP Specialist referral from:    [x] PCP  [] Provider   [] Ambulatory Care Management [] Other     For:                  [x] Continued Conversation for ACP decision making / Goals of Care             [] Code Status Discussion             [x] Completion of Adv Directive             [] Completion of Portable DNR order             [] Other (Specify)    Date Referral Received: 6/4/21    Today's Outreach:  [] First   [] Second  [x] Third                                           Third outreach made by [x]  phone  [] email []   [email or web address]     Intervention:  [x] Spoke with Patient's sister Lizz Conde   [] Left VM requesting return call      Outcome: Ms Bob Ortiz states she will be picking Ms Jean-Paul Peacock up on 6/24/21 from [place] to bring her back to her house. Next Step:   [] ACP scheduled conversation  [x] Outreach again on 6/25/21              [] Email / Mail [address]  [] Email / Mail Advance Directive   []  Closing referral.  Routing closure to referring provider/staff and to ACP Specialist .      Thank you for this referral. unknown/Other

## 2024-02-05 LAB
A1C WITH ESTIMATED AVERAGE GLUCOSE RESULT: 5.9 % — HIGH (ref 4–5.6)
CULTURE RESULTS: SIGNIFICANT CHANGE UP
ESTIMATED AVERAGE GLUCOSE: 123 MG/DL — HIGH (ref 68–114)
SPECIMEN SOURCE: SIGNIFICANT CHANGE UP

## 2024-02-05 RX ORDER — DEXTROSE 50 % IN WATER 50 %
15 SYRINGE (ML) INTRAVENOUS ONCE
Refills: 0 | Status: COMPLETED | OUTPATIENT
Start: 2024-02-05 | End: 2024-02-05

## 2024-02-05 RX ORDER — GLUCAGON INJECTION, SOLUTION 0.5 MG/.1ML
1 INJECTION, SOLUTION SUBCUTANEOUS ONCE
Refills: 0 | Status: COMPLETED | OUTPATIENT
Start: 2024-02-05 | End: 2024-02-05

## 2024-02-05 RX ORDER — SODIUM CHLORIDE 9 MG/ML
1000 INJECTION, SOLUTION INTRAVENOUS
Refills: 0 | Status: DISCONTINUED | OUTPATIENT
Start: 2024-02-05 | End: 2024-02-07

## 2024-02-05 RX ADMIN — Medication 1 MILLIGRAM(S): at 05:27

## 2024-02-05 RX ADMIN — DONEPEZIL HYDROCHLORIDE 10 MILLIGRAM(S): 10 TABLET, FILM COATED ORAL at 21:36

## 2024-02-05 RX ADMIN — CALCITRIOL 0.25 MICROGRAM(S): 0.5 CAPSULE ORAL at 12:00

## 2024-02-05 RX ADMIN — FINASTERIDE 5 MILLIGRAM(S): 5 TABLET, FILM COATED ORAL at 12:32

## 2024-02-05 RX ADMIN — SODIUM CHLORIDE 75 MILLILITER(S): 9 INJECTION, SOLUTION INTRAVENOUS at 08:30

## 2024-02-05 RX ADMIN — Medication 6: at 14:53

## 2024-02-05 RX ADMIN — GLUCAGON INJECTION, SOLUTION 1 MILLIGRAM(S): 0.5 INJECTION, SOLUTION SUBCUTANEOUS at 07:51

## 2024-02-05 RX ADMIN — Medication 3 MILLIGRAM(S): at 21:37

## 2024-02-05 RX ADMIN — Medication 6: at 17:19

## 2024-02-05 RX ADMIN — Medication 15 GRAM(S): at 08:22

## 2024-02-05 RX ADMIN — SODIUM CHLORIDE 75 MILLILITER(S): 9 INJECTION, SOLUTION INTRAVENOUS at 21:41

## 2024-02-05 RX ADMIN — ESCITALOPRAM OXALATE 10 MILLIGRAM(S): 10 TABLET, FILM COATED ORAL at 12:32

## 2024-02-05 RX ADMIN — Medication 1 TABLET(S): at 12:35

## 2024-02-05 RX ADMIN — PANTOPRAZOLE SODIUM 40 MILLIGRAM(S): 20 TABLET, DELAYED RELEASE ORAL at 08:27

## 2024-02-05 NOTE — PROGRESS NOTE ADULT - SUBJECTIVE AND OBJECTIVE BOX
INTERVAL HPI/OVERNIGHT EVENTS:  no  further  emesis  toelrain  oral  diet      REVIEW OF SYSTEMS:  CONSTITUTIONAL:  no  complaints    NECK: No pain or stiffnes  RESPIRATORY: No SOB   CARDIOVASCULAR: No chest pain, palpitations, dizziness,   GASTROINTESTINAL: No abdominal pain. No nausea, vomiting,   NEUROLOGICAL: No headaches, no  blurry  vision no  dizziness  SKIN: No itching,   MUSCULOSKELETAL: No pain    MEDICATION:  calcitriol   Capsule 0.25 MICROGram(s) Oral daily  dextrose 5% + lactated ringers. 1000 milliLiter(s) IV Continuous <Continuous>  dextrose 5%. 1000 milliLiter(s) IV Continuous <Continuous>  dextrose 5%. 1000 milliLiter(s) IV Continuous <Continuous>  dextrose 50% Injectable 25 Gram(s) IV Push once  dextrose 50% Injectable 25 Gram(s) IV Push once  dextrose 50% Injectable 12.5 Gram(s) IV Push once  dextrose Oral Gel 15 Gram(s) Oral once PRN  donepezil 10 milliGRAM(s) Oral at bedtime  doxazosin 1 milliGRAM(s) Oral daily  escitalopram 10 milliGRAM(s) Oral daily  finasteride 5 milliGRAM(s) Oral daily  insulin lispro (ADMELOG) corrective regimen sliding scale   SubCutaneous three times a day before meals  melatonin 3 milliGRAM(s) Oral at bedtime  metoprolol succinate ER 50 milliGRAM(s) Oral daily  multivitamin 1 Tablet(s) Oral daily  ondansetron Injectable 4 milliGRAM(s) IV Push every 8 hours PRN  pantoprazole    Tablet 40 milliGRAM(s) Oral before breakfast    Vital Signs Last 24 Hrs  T(C): 36.5 (2024 05:40), Max: 37.1 (2024 10:43)  T(F): 97.7 (2024 05:40), Max: 98.7 (2024 10:43)  HR: 61 (2024 05:40) (54 - 61)  BP: 118/67 (2024 05:40) (104/62 - 163/52)  BP(mean): --  RR: 18 (2024 05:40) (15 - 18)  SpO2: 99% (2024 05:40) (96% - 100%)    Parameters below as of 2024 23:56  Patient On (Oxygen Delivery Method): room air        PHYSICAL EXAM:  GENERAL: NAD, well-groomed, well-developed  HEENT : [place]  clear sclerae anicteric  NECK: Supple, No JVD, Normal thyroid  NERVOUS SYSTEM:  Alert orientedx1   no  focal  deficits;   CHEST/LUNG: Clear    HEART: Regular rate and rhythm; No murmurs, rubs, or gallops  ABDOMEN: Soft, Nontender, Nondistended; Bowel sounds present  EXTREMITIES:  no  edema no  tenderness  SKIN: No rashes   LABS:                        8.2    4.79  )-----------( 300      ( 2024 04:15 )             27.4     02-04    140  |  109<H>  |  19  ----------------------------<  76  3.8   |  27  |  1.39<H>    Ca    8.8      2024 04:15    TPro  5.8<L>  /  Alb  2.0<L>  /  TBili  0.4  /  DBili  x   /  AST  18  /  ALT  13  /  AlkPhos  69  02-04      Urinalysis Basic - ( 2024 08:15 )    Color: Yellow / Appearance: Cloudy / S.017 / pH: x  Gluc: x / Ketone: Negative mg/dL  / Bili: Negative / Urobili: 0.2 mg/dL   Blood: x / Protein: 30 mg/dL / Nitrite: Negative   Leuk Esterase: Moderate / RBC: 6-10 /HPF / WBC 6-10 /HPF   Sq Epi: x / Non Sq Epi: x / Bacteria: x      CAPILLARY BLOOD GLUCOSE      POCT Blood Glucose.: 112 mg/dL (2024 08:56)  POCT Blood Glucose.: 59 mg/dL (2024 08:05)  POCT Blood Glucose.: 42 mg/dL (2024 07:32)  POCT Blood Glucose.: 245 mg/dL (2024 15:49)  POCT Blood Glucose.: 241 mg/dL (2024 11:23)      RADIOLOGY & ADDITIONAL TESTS:    Imaging reports  Personally Reviewed:  [ ] YES  [ ] NO    Consultant(s) Notes Reviewed:  [ ] YES  [ ] NO    Care Discussed with Consultants/Other Providers [x ] YES  [ ] NO    A/P   89M PMH HTN, DM, CKD, dementia, legally blind BIBEMS d/t hypoglycemia. Per triage note, FS 40 at home, 80 on ED arrival s/p glucagon, chocolate. Pt w/o complaints. was    reported  with  nausea  vomiting  admitted  for  further w/u  and Rx  discussed  with  pt's  daughter  Niall  patient  with  functional deline over past 7 months unable to walk    # nauseaa  and  vomiting    ivf  zofran  protonix   observe  on oral diet    # DM/hypoglycemia  d/c  home  meds    cover  with  short  acting  insulin  as per  scale      # dementia  continue donazepil    # insomnia melatonin    # htn  metoprolol  doxazosin    # depression  anxiety   escitalopram    # BPH  finasteride doxazosin    #  will D/C  plavix  and  Lipitor  discussed  with patient,'s  daughter  niall  patient  never  had   stroke  or  heart  attack or  peripheral  vascular  diease  fpr  PT  evaluation

## 2024-02-05 NOTE — PHYSICAL THERAPY INITIAL EVALUATION ADULT - ADDITIONAL COMMENTS
Pt is a poor historian. PT spoke to daughter Jessica on phone: Pt lives in 2 level house. 2 [address] front entrance w/ close B/L rails. Once inside, there are 9 steps to reach his apartment downstairs in house w/ close walls but no railings. Pt has family that lives upstairs. Currently has HHA 9.5 hours a day for 7 days a week. Daughter states that due to decline of pt's health along the past 7 months, he has now become bedridden. Prior to that he was able to get up and use the bathroom with RW and assist.

## 2024-02-05 NOTE — PHYSICAL THERAPY INITIAL EVALUATION ADULT - BALANCE DISTURBANCE, IDENTIFIED IMPAIRMENT CONTRIBUTE, REHAB EVAL
impaired motor control/abnormal muscle tone/impaired postural control/decreased ROM/decreased strength

## 2024-02-05 NOTE — PHYSICAL THERAPY INITIAL EVALUATION ADULT - SITTING BALANCE: STATIC
[facility]  Progress Note    [id number] [facility] [address] Patient Status:  Inpatient    1993 MRN [id number]   [facility]NE-A Attending Miranda De Santiago MD   [facility] Day # 1 PCP [facility]     CC: Nausea, vomiting, headache and blurri Awake,alert,nonfocal; has legal blindness and uses cane for this according to patient.   Has  shunt, shunt reservoir empties and fills appropriately per neurosurgeon  Psych: Mood and affect appears normal      Labs:   Lab Results   Component Value Date neurosurgery clearance  At this point Mr. Jakie Halsted  is expected to be discharge to: Home      Questions/concerns and Plan of care were discussed with patient and RN.             Lenka Ye MD  12/7/2019  11:13 AM fair balance

## 2024-02-05 NOTE — PHYSICAL THERAPY INITIAL EVALUATION ADULT - PERTINENT HX OF CURRENT PROBLEM, REHAB EVAL
Per H&P: 89M PMH HTN, DM, CKD, dementia, legally blind BIBEMS d/t hypoglycemia. Per triage note, FS 40 at home, 80 on ED arrival s/p glucagon, chocolate. Pt w/o complaints. was    reported  with  nausea  vomiting  admitted  for  further w/u  and Rx  discussed  with  pt's  daughter  Jessica  patient  with  functional deline over past 7 months unable to walk

## 2024-02-05 NOTE — PHYSICAL THERAPY INITIAL EVALUATION ADULT - IMPAIRMENTS FOUND, PT EVAL
aerobic capacity/endurance/arousal, attention, and cognition/cognitive impairment/decreased midline orientation/ergonomics and body mechanics/gait, locomotion, and balance/gross motor/joint integrity and mobility/muscle strength/poor safety awareness/posture/ROM/tone

## 2024-02-06 LAB
ALBUMIN SERPL ELPH-MCNC: 1.8 G/DL — LOW (ref 3.3–5)
ALP SERPL-CCNC: 75 U/L — SIGNIFICANT CHANGE UP (ref 40–120)
ALT FLD-CCNC: 13 U/L — SIGNIFICANT CHANGE UP (ref 12–78)
ANION GAP SERPL CALC-SCNC: 2 MMOL/L — LOW (ref 5–17)
AST SERPL-CCNC: 17 U/L — SIGNIFICANT CHANGE UP (ref 15–37)
BILIRUB SERPL-MCNC: 0.3 MG/DL — SIGNIFICANT CHANGE UP (ref 0.2–1.2)
BUN SERPL-MCNC: 21 MG/DL — SIGNIFICANT CHANGE UP (ref 7–23)
CALCIUM SERPL-MCNC: 8.7 MG/DL — SIGNIFICANT CHANGE UP (ref 8.5–10.1)
CHLORIDE SERPL-SCNC: 113 MMOL/L — HIGH (ref 96–108)
CO2 SERPL-SCNC: 27 MMOL/L — SIGNIFICANT CHANGE UP (ref 22–31)
CREAT SERPL-MCNC: 1.27 MG/DL — SIGNIFICANT CHANGE UP (ref 0.5–1.3)
EGFR: 54 ML/MIN/1.73M2 — LOW
GLUCOSE SERPL-MCNC: 109 MG/DL — HIGH (ref 70–99)
HCT VFR BLD CALC: 27.7 % — LOW (ref 39–50)
HGB BLD-MCNC: 8.3 G/DL — LOW (ref 13–17)
MCHC RBC-ENTMCNC: 22.9 PG — LOW (ref 27–34)
MCHC RBC-ENTMCNC: 30 G/DL — LOW (ref 32–36)
MCV RBC AUTO: 76.3 FL — LOW (ref 80–100)
NRBC # BLD: 0 /100 WBCS — SIGNIFICANT CHANGE UP (ref 0–0)
PLATELET # BLD AUTO: 294 K/UL — SIGNIFICANT CHANGE UP (ref 150–400)
POTASSIUM SERPL-MCNC: 4.1 MMOL/L — SIGNIFICANT CHANGE UP (ref 3.5–5.3)
POTASSIUM SERPL-SCNC: 4.1 MMOL/L — SIGNIFICANT CHANGE UP (ref 3.5–5.3)
PROT SERPL-MCNC: 5.4 GM/DL — LOW (ref 6–8.3)
RBC # BLD: 3.63 M/UL — LOW (ref 4.2–5.8)
RBC # FLD: 20 % — HIGH (ref 10.3–14.5)
SODIUM SERPL-SCNC: 142 MMOL/L — SIGNIFICANT CHANGE UP (ref 135–145)
WBC # BLD: 6.93 K/UL — SIGNIFICANT CHANGE UP (ref 3.8–10.5)
WBC # FLD AUTO: 6.93 K/UL — SIGNIFICANT CHANGE UP (ref 3.8–10.5)

## 2024-02-06 RX ADMIN — Medication 3 MILLIGRAM(S): at 21:42

## 2024-02-06 RX ADMIN — CALCITRIOL 0.25 MICROGRAM(S): 0.5 CAPSULE ORAL at 11:58

## 2024-02-06 RX ADMIN — SODIUM CHLORIDE 75 MILLILITER(S): 9 INJECTION, SOLUTION INTRAVENOUS at 12:12

## 2024-02-06 RX ADMIN — DONEPEZIL HYDROCHLORIDE 10 MILLIGRAM(S): 10 TABLET, FILM COATED ORAL at 21:42

## 2024-02-06 RX ADMIN — Medication 4: at 17:25

## 2024-02-06 RX ADMIN — PANTOPRAZOLE SODIUM 40 MILLIGRAM(S): 20 TABLET, DELAYED RELEASE ORAL at 05:44

## 2024-02-06 RX ADMIN — ESCITALOPRAM OXALATE 10 MILLIGRAM(S): 10 TABLET, FILM COATED ORAL at 11:58

## 2024-02-06 RX ADMIN — Medication 1 MILLIGRAM(S): at 05:44

## 2024-02-06 RX ADMIN — Medication 1 TABLET(S): at 11:58

## 2024-02-06 RX ADMIN — Medication 4: at 11:54

## 2024-02-06 RX ADMIN — FINASTERIDE 5 MILLIGRAM(S): 5 TABLET, FILM COATED ORAL at 11:57

## 2024-02-06 NOTE — DIETITIAN INITIAL EVALUATION ADULT - REASON
Pt sleeping with head tilted to the right at time of visit; unable to accurately observe for visible signs of malnutrition

## 2024-02-06 NOTE — DIETITIAN INITIAL EVALUATION ADULT - PERTINENT LABORATORY DATA
02-06    142  |  113<H>  |  21  ----------------------------<  109<H>  4.1   |  27  |  1.27    Ca    8.7      06 Feb 2024 06:00    TPro  5.4<L>  /  Alb  1.8<L>  /  TBili  0.3  /  DBili  x   /  AST  17  /  ALT  13  /  AlkPhos  75  02-06  POCT Blood Glucose.: 144 mg/dL (02-06-24 @ 08:01)  A1C with Estimated Average Glucose Result: 5.9 % (02-05-24 @ 05:25)

## 2024-02-06 NOTE — PROGRESS NOTE ADULT - SUBJECTIVE AND OBJECTIVE BOX
INTERVAL HPI/OVERNIGHT EVENTS:        REVIEW OF SYSTEMS:  CONSTITUTIONAL: alert  comfortable no  complaints    NECK: No pain or stiffnes  RESPIRATORY: No SOB   CARDIOVASCULAR: No chest pain, palpitations, dizziness,   GASTROINTESTINAL: No abdominal pain. No nausea, vomiting,   NEUROLOGICAL: No headaches, no  blurry  vision no  dizziness  SKIN: No itching,   MUSCULOSKELETAL: No pain    MEDICATION:  calcitriol   Capsule 0.25 MICROGram(s) Oral daily  dextrose 5% + lactated ringers. 1000 milliLiter(s) IV Continuous <Continuous>  dextrose 5%. 1000 milliLiter(s) IV Continuous <Continuous>  dextrose 5%. 1000 milliLiter(s) IV Continuous <Continuous>  dextrose 50% Injectable 25 Gram(s) IV Push once  dextrose 50% Injectable 25 Gram(s) IV Push once  dextrose 50% Injectable 12.5 Gram(s) IV Push once  dextrose Oral Gel 15 Gram(s) Oral once PRN  donepezil 10 milliGRAM(s) Oral at bedtime  doxazosin 1 milliGRAM(s) Oral daily  escitalopram 10 milliGRAM(s) Oral daily  finasteride 5 milliGRAM(s) Oral daily  insulin lispro (ADMELOG) corrective regimen sliding scale   SubCutaneous three times a day before meals  melatonin 3 milliGRAM(s) Oral at bedtime  metoprolol succinate ER 50 milliGRAM(s) Oral daily  multivitamin 1 Tablet(s) Oral daily  ondansetron Injectable 4 milliGRAM(s) IV Push every 8 hours PRN  pantoprazole    Tablet 40 milliGRAM(s) Oral before breakfast    Vital Signs Last 24 Hrs  T(C): 36.4 (06 Feb 2024 05:00), Max: 36.5 (05 Feb 2024 23:30)  T(F): 97.6 (06 Feb 2024 05:00), Max: 97.7 (05 Feb 2024 23:30)  HR: 59 (06 Feb 2024 05:00) (59 - 71)  BP: 148/72 (06 Feb 2024 05:00) (123/63 - 149/78)  BP(mean): --  RR: 18 (06 Feb 2024 05:00) (18 - 18)  SpO2: 98% (06 Feb 2024 05:00) (98% - 99%)    Parameters below as of 06 Feb 2024 05:00  Patient On (Oxygen Delivery Method): room air        PHYSICAL EXAM:  GENERAL: NAD, well-groomed, well-developed  HEENT : Conjuntivae  clear sclerae anicteric  NECK: Supple, No JVD, Normal thyroid  NERVOUS SYSTEM:  Alert oriented x1  no  focal  deficits;   CHEST/LUNG: Clear    HEART: Regular rate and rhythm; No murmurs, rubs, or gallops  ABDOMEN: Soft, Nontender, Nondistended; Bowel sounds present  EXTREMITIES:  no  edema no  tenderness  SKIN: No rashes   LABS:                        8.3    6.93  )-----------( 294      ( 06 Feb 2024 06:00 )             27.7     02-06    142  |  113<H>  |  21  ----------------------------<  109<H>  4.1   |  27  |  1.27    Ca    8.7      06 Feb 2024 06:00    TPro  5.4<L>  /  Alb  1.8<L>  /  TBili  0.3  /  DBili  x   /  AST  17  /  ALT  13  /  AlkPhos  75  02-06      Urinalysis Basic - ( 06 Feb 2024 06:00 )    Color: x / Appearance: x / SG: x / pH: x  Gluc: 109 mg/dL / Ketone: x  / Bili: x / Urobili: x   Blood: x / Protein: x / Nitrite: x   Leuk Esterase: x / RBC: x / WBC x   Sq Epi: x / Non Sq Epi: x / Bacteria: x      CAPILLARY BLOOD GLUCOSE      POCT Blood Glucose.: 144 mg/dL (06 Feb 2024 08:01)  POCT Blood Glucose.: 266 mg/dL (05 Feb 2024 23:10)  POCT Blood Glucose.: 59 mg/dL (05 Feb 2024 23:08)  POCT Blood Glucose.: 263 mg/dL (05 Feb 2024 16:33)  POCT Blood Glucose.: 264 mg/dL (05 Feb 2024 14:41)  POCT Blood Glucose.: 185 mg/dL (05 Feb 2024 11:03)      RADIOLOGY & ADDITIONAL TESTS:    Imaging reports  Personally Reviewed:  [ ] YES  [ ] NO    Consultant(s) Notes Reviewed:  [ ] YES  [ ] NO    Care Discussed with Consultants/Other Providers [x ] YES  [ ] NO  A/P   89M PMH HTN, DM, CKD, dementia, legally blind BIBEMS d/t hypoglycemia. Per triage note, FS 40 at home, 80 on ED arrival s/p glucagon, chocolate. Pt w/o complaints. was    reported  with  nausea  vomiting  admitted  for  further w/u  and Rx  discussed  with  pt's  daughter  Niall  patient  with  functional deline over past 7 months unable to walk    # nauseaa  and  vomiting    ivf  zofran  protonix   observe  on oral diet    # DM/hypoglycemia  d/c  home  meds    cover  with  short  acting  insulin  as per  scale      # dementia  continue donazepil    # insomnia melatonin    # htn  metoprolol  doxazosin    # depression  anxiety   escitalopram    # BPH  finasteride doxazosin    #  will D/C  plavix  and  Lipitor  discussed  with patient,'s  daughter  niall  patient  never  had   stroke  or  heart  attack or  peripheral  vascular  diease  fpr  PT  evaluation    discharge  to  [facility]     INTERVAL HPI/OVERNIGHT EVENTS:        REVIEW OF SYSTEMS:  CONSTITUTIONAL: alert  comfortable no  complaints    NECK: No pain or stiffnes  RESPIRATORY: No SOB   CARDIOVASCULAR: No chest pain, palpitations, dizziness,   GASTROINTESTINAL: No abdominal pain. No nausea, vomiting,   NEUROLOGICAL: No headaches, no  blurry  vision no  dizziness  SKIN: No itching,   MUSCULOSKELETAL: No pain    MEDICATION:  calcitriol   Capsule 0.25 MICROGram(s) Oral daily  dextrose 5% + lactated ringers. 1000 milliLiter(s) IV Continuous <Continuous>  dextrose 5%. 1000 milliLiter(s) IV Continuous <Continuous>  dextrose 5%. 1000 milliLiter(s) IV Continuous <Continuous>  dextrose 50% Injectable 25 Gram(s) IV Push once  dextrose 50% Injectable 25 Gram(s) IV Push once  dextrose 50% Injectable 12.5 Gram(s) IV Push once  dextrose Oral Gel 15 Gram(s) Oral once PRN  donepezil 10 milliGRAM(s) Oral at bedtime  doxazosin 1 milliGRAM(s) Oral daily  escitalopram 10 milliGRAM(s) Oral daily  finasteride 5 milliGRAM(s) Oral daily  insulin lispro (ADMELOG) corrective regimen sliding scale   SubCutaneous three times a day before meals  melatonin 3 milliGRAM(s) Oral at bedtime  metoprolol succinate ER 50 milliGRAM(s) Oral daily  multivitamin 1 Tablet(s) Oral daily  ondansetron Injectable 4 milliGRAM(s) IV Push every 8 hours PRN  pantoprazole    Tablet 40 milliGRAM(s) Oral before breakfast    Vital Signs Last 24 Hrs  T(C): 36.4 (06 Feb 2024 05:00), Max: 36.5 (05 Feb 2024 23:30)  T(F): 97.6 (06 Feb 2024 05:00), Max: 97.7 (05 Feb 2024 23:30)  HR: 59 (06 Feb 2024 05:00) (59 - 71)  BP: 148/72 (06 Feb 2024 05:00) (123/63 - 149/78)  BP(mean): --  RR: 18 (06 Feb 2024 05:00) (18 - 18)  SpO2: 98% (06 Feb 2024 05:00) (98% - 99%)    Parameters below as of 06 Feb 2024 05:00  Patient On (Oxygen Delivery Method): room air        PHYSICAL EXAM:  GENERAL: NAD, well-groomed, well-developed  HEENT : Conjuntivae  clear sclerae anicteric  NECK: Supple, No JVD, Normal thyroid  NERVOUS SYSTEM:  Alert oriented x1  no  focal  deficits;   CHEST/LUNG: Clear    HEART: Regular rate and rhythm; No murmurs, rubs, or gallops  ABDOMEN: Soft, Nontender, Nondistended; Bowel sounds present  EXTREMITIES:  no  edema no  tenderness  SKIN: No rashes   LABS:                        8.3    6.93  )-----------( 294      ( 06 Feb 2024 06:00 )             27.7     02-06    142  |  113<H>  |  21  ----------------------------<  109<H>  4.1   |  27  |  1.27    Ca    8.7      06 Feb 2024 06:00    TPro  5.4<L>  /  Alb  1.8<L>  /  TBili  0.3  /  DBili  x   /  AST  17  /  ALT  13  /  AlkPhos  75  02-06      Urinalysis Basic - ( 06 Feb 2024 06:00 )    Color: x / Appearance: x / SG: x / pH: x  Gluc: 109 mg/dL / Ketone: x  / Bili: x / Urobili: x   Blood: x / Protein: x / Nitrite: x   Leuk Esterase: x / RBC: x / WBC x   Sq Epi: x / Non Sq Epi: x / Bacteria: x      CAPILLARY BLOOD GLUCOSE      POCT Blood Glucose.: 144 mg/dL (06 Feb 2024 08:01)  POCT Blood Glucose.: 266 mg/dL (05 Feb 2024 23:10)  POCT Blood Glucose.: 59 mg/dL (05 Feb 2024 23:08)  POCT Blood Glucose.: 263 mg/dL (05 Feb 2024 16:33)  POCT Blood Glucose.: 264 mg/dL (05 Feb 2024 14:41)  POCT Blood Glucose.: 185 mg/dL (05 Feb 2024 11:03)      RADIOLOGY & ADDITIONAL TESTS:    Imaging reports  Personally Reviewed:  [ ] YES  [ ] NO    Consultant(s) Notes Reviewed:  [ ] YES  [ ] NO    Care Discussed with Consultants/Other Providers [x ] YES  [ ] NO  A/P   89M PMH HTN, DM, CKD, dementia, legally blind BIBEMS d/t hypoglycemia. Per triage note, FS 40 at home, 80 on ED arrival s/p glucagon, chocolate. Pt w/o complaints. was    reported  with  nausea  vomiting  admitted  for  further w/u  and Rx  discussed  with  pt's  daughter  Niall  patient  with  functional deline over past 7 months unable to walk    # nauseaa  and  vomiting    ivf  zofran  protonix   observe  on oral diet    # DM/hypoglycemia  d/c  home  meds    cover  with  short  acting  insulin  as per  scale      # dementia  continue donazepil    # insomnia melatonin    # htn  metoprolol  doxazosin    # depression  anxiety   escitalopram    # BPH  finasteride doxazosin  anemia  acute  on  chronic  check  iron  studies  b12 folate  reapeat  cbc  if  satbel  can  go  to  hrehab  pending  irn b1 folate  reprts    #  will D/C  plavix  and  Lipitor  discussed  with patient,'s  daughter  niall  patient  never  had   stroke  or  heart  attack or  peripheral  vascular  diease  fpr  PT  evaluation    discharge  to  [facility]

## 2024-02-06 NOTE — DIETITIAN INITIAL EVALUATION ADULT - OTHER INFO
Pt with PMH of HTN, DM, CKD 3, dementia, legally blind. Admitted with hypoglycemia, nausea and vomiting.  Pt sleeping at time of visit; no family present at bedside. Per flow sheets, pt consuming % of documented meals during LOS. Requires total feeding assistance. Observed breakfast tray at bedside; pt consumed >75% of breakfast this AM(02/06). Per flow sheets, pt with episodes of hypoglycemia; IV fluids noted. No reports of any chewing or swallowing difficulties or any current N/V/D/C.  Pt with hx of DM; A1c=5.9% noted. Per H & P, pt takes Tradjenta and repaglinide to control his BG levels at home.  No need for renal replacement diet; will d/c.  Plan is for [facility].

## 2024-02-06 NOTE — DIETITIAN INITIAL EVALUATION ADULT - NS FNS DIET ORDER
Diet, DASH/TLC:   Sodium & Cholesterol Restricted  Consistent Carbohydrate {No Snacks}  For patients receiving Renal Replacement - No Protein Restr, No Conc K, No Conc Phos, Low Sodium (RENAL) (02-04-24 @ 12:53) [Active]

## 2024-02-06 NOTE — DIETITIAN INITIAL EVALUATION ADULT - PERTINENT MEDS FT
MEDICATIONS  (STANDING):  calcitriol   Capsule 0.25 MICROGram(s) Oral daily  dextrose 5% + lactated ringers. 1000 milliLiter(s) (75 mL/Hr) IV Continuous <Continuous>  dextrose 5%. 1000 milliLiter(s) (50 mL/Hr) IV Continuous <Continuous>  dextrose 5%. 1000 milliLiter(s) (100 mL/Hr) IV Continuous <Continuous>  dextrose 50% Injectable 25 Gram(s) IV Push once  dextrose 50% Injectable 25 Gram(s) IV Push once  dextrose 50% Injectable 12.5 Gram(s) IV Push once  donepezil 10 milliGRAM(s) Oral at bedtime  doxazosin 1 milliGRAM(s) Oral daily  escitalopram 10 milliGRAM(s) Oral daily  finasteride 5 milliGRAM(s) Oral daily  insulin lispro (ADMELOG) corrective regimen sliding scale   SubCutaneous three times a day before meals  melatonin 3 milliGRAM(s) Oral at bedtime  metoprolol succinate ER 50 milliGRAM(s) Oral daily  multivitamin 1 Tablet(s) Oral daily  pantoprazole    Tablet 40 milliGRAM(s) Oral before breakfast    MEDICATIONS  (PRN):  dextrose Oral Gel 15 Gram(s) Oral once PRN Blood Glucose LESS THAN 70 milliGRAM(s)/deciliter  ondansetron Injectable 4 milliGRAM(s) IV Push every 8 hours PRN Nausea and/or Vomiting

## 2024-02-06 NOTE — DIETITIAN INITIAL EVALUATION ADULT - NSFNSPHYEXAMSKINFT_GEN_A_CORE
As per flow sheets, pt with suspected DTI - R heel, suspected DTI - L heel, and stage I pressure ulcer - sacrum

## 2024-02-07 ENCOUNTER — TRANSCRIPTION ENCOUNTER (OUTPATIENT)
Age: 89
End: 2024-02-07

## 2024-02-07 VITALS
DIASTOLIC BLOOD PRESSURE: 59 MMHG | SYSTOLIC BLOOD PRESSURE: 102 MMHG | TEMPERATURE: 98 F | OXYGEN SATURATION: 98 % | HEART RATE: 62 BPM | RESPIRATION RATE: 18 BRPM

## 2024-02-07 LAB
ALBUMIN SERPL ELPH-MCNC: 1.7 G/DL — LOW (ref 3.3–5)
ALP SERPL-CCNC: 69 U/L — SIGNIFICANT CHANGE UP (ref 40–120)
ALT FLD-CCNC: 11 U/L — LOW (ref 12–78)
ANION GAP SERPL CALC-SCNC: 4 MMOL/L — LOW (ref 5–17)
AST SERPL-CCNC: 14 U/L — LOW (ref 15–37)
BILIRUB SERPL-MCNC: 0.4 MG/DL — SIGNIFICANT CHANGE UP (ref 0.2–1.2)
BUN SERPL-MCNC: 19 MG/DL — SIGNIFICANT CHANGE UP (ref 7–23)
CALCIUM SERPL-MCNC: 8.7 MG/DL — SIGNIFICANT CHANGE UP (ref 8.5–10.1)
CHLORIDE SERPL-SCNC: 113 MMOL/L — HIGH (ref 96–108)
CO2 SERPL-SCNC: 28 MMOL/L — SIGNIFICANT CHANGE UP (ref 22–31)
CREAT SERPL-MCNC: 1.13 MG/DL — SIGNIFICANT CHANGE UP (ref 0.5–1.3)
EGFR: 62 ML/MIN/1.73M2 — SIGNIFICANT CHANGE UP
GLUCOSE SERPL-MCNC: 173 MG/DL — HIGH (ref 70–99)
HCT VFR BLD CALC: 27.7 % — LOW (ref 39–50)
HGB BLD-MCNC: 8 G/DL — LOW (ref 13–17)
IRON SATN MFR SERPL: 14 % — LOW (ref 16–55)
IRON SATN MFR SERPL: 24 UG/DL — LOW (ref 45–165)
MCHC RBC-ENTMCNC: 22.2 PG — LOW (ref 27–34)
MCHC RBC-ENTMCNC: 28.9 G/DL — LOW (ref 32–36)
MCV RBC AUTO: 76.7 FL — LOW (ref 80–100)
NRBC # BLD: 0 /100 WBCS — SIGNIFICANT CHANGE UP (ref 0–0)
PLATELET # BLD AUTO: 194 K/UL — SIGNIFICANT CHANGE UP (ref 150–400)
POTASSIUM SERPL-MCNC: 3.8 MMOL/L — SIGNIFICANT CHANGE UP (ref 3.5–5.3)
POTASSIUM SERPL-SCNC: 3.8 MMOL/L — SIGNIFICANT CHANGE UP (ref 3.5–5.3)
PROT SERPL-MCNC: 5.4 GM/DL — LOW (ref 6–8.3)
RBC # BLD: 3.61 M/UL — LOW (ref 4.2–5.8)
RBC # FLD: 20.3 % — HIGH (ref 10.3–14.5)
SODIUM SERPL-SCNC: 145 MMOL/L — SIGNIFICANT CHANGE UP (ref 135–145)
TIBC SERPL-MCNC: 175 UG/DL — LOW (ref 220–430)
UIBC SERPL-MCNC: 151 UG/DL — SIGNIFICANT CHANGE UP (ref 110–370)
VIT B12 SERPL-MCNC: 1026 PG/ML — SIGNIFICANT CHANGE UP (ref 232–1245)
WBC # BLD: 6.36 K/UL — SIGNIFICANT CHANGE UP (ref 3.8–10.5)
WBC # FLD AUTO: 6.36 K/UL — SIGNIFICANT CHANGE UP (ref 3.8–10.5)

## 2024-02-07 RX ORDER — LINAGLIPTIN 5 MG/1
1 TABLET, FILM COATED ORAL
Qty: 0 | Refills: 0 | DISCHARGE

## 2024-02-07 RX ORDER — REPAGLINIDE 1 MG/1
1 TABLET ORAL
Qty: 0 | Refills: 0 | DISCHARGE

## 2024-02-07 RX ORDER — INSULIN LISPRO 100/ML
2 VIAL (ML) SUBCUTANEOUS
Qty: 0 | Refills: 0 | DISCHARGE
Start: 2024-02-07

## 2024-02-07 RX ADMIN — SODIUM CHLORIDE 75 MILLILITER(S): 9 INJECTION, SOLUTION INTRAVENOUS at 00:51

## 2024-02-07 RX ADMIN — CALCITRIOL 0.25 MICROGRAM(S): 0.5 CAPSULE ORAL at 11:52

## 2024-02-07 RX ADMIN — Medication 1 TABLET(S): at 11:52

## 2024-02-07 RX ADMIN — Medication 2: at 11:51

## 2024-02-07 RX ADMIN — ESCITALOPRAM OXALATE 10 MILLIGRAM(S): 10 TABLET, FILM COATED ORAL at 11:52

## 2024-02-07 RX ADMIN — FINASTERIDE 5 MILLIGRAM(S): 5 TABLET, FILM COATED ORAL at 11:52

## 2024-02-07 RX ADMIN — Medication 1 MILLIGRAM(S): at 06:12

## 2024-02-07 RX ADMIN — SODIUM CHLORIDE 75 MILLILITER(S): 9 INJECTION, SOLUTION INTRAVENOUS at 13:16

## 2024-02-07 RX ADMIN — Medication 2: at 09:16

## 2024-02-07 RX ADMIN — PANTOPRAZOLE SODIUM 40 MILLIGRAM(S): 20 TABLET, DELAYED RELEASE ORAL at 06:12

## 2024-02-07 RX ADMIN — Medication 50 MILLIGRAM(S): at 06:12

## 2024-02-07 NOTE — PROGRESS NOTE ADULT - SUBJECTIVE AND OBJECTIVE BOX
{\rtf1\nvlcpb59163\ansi\ryscxmj5972\ftnbj\uc1\deff0  {\fonttbl{\f0 \fnil Segoe UI;}{\f1 \fnil \fcharset0 Segoe UI;}{\f2 \fnil Times New Emmanuel;}}  {\colortbl ;\plb886\xvzem877\cprf201 ;\red0\green0\blue0 ;\red0\green0\blue0 ;}  {\stylesheet{\f0\fs20 Normal;}{\cs1 Default Paragraph Font;}{\cs2\f0\fs16 Line Number;}{\cs3\f2\fs24 Hyperlink;}}  {\*\revtbl{Unknown;}}  \tbqxkz25315\ksgyfs70055\yawsg8022\xwspd3808\lsqpc7067\faotz1322\rgcopvh612\wkhcxxf627\nogrowautofit\edeumu685\formshade\nofeaturethrottle1\dntblnsbdb\fet4\aendnotes\aftnnrlc\pgbrdrhead\pgbrdrfoot  \sectd\eaojly83837\umtcyw56721\guttersxn0\gnwbpwms5112\eiuaswcd0677\wziufmff1044\pkvlgyvb2403\plaqwxn381\ceofnbs017\sbkpage\pgncont\pgndec  \plain\plain\f0\fs24\pard\plain\f0\fs24\plain\f0\fs20\rrty6767\hich\f0\dbch\f0\loch\f0\fs20\par  \par  INTERVAL HPI/OVERNIGHT EVENTS:\par  \par  awaiting  discharge  to  rehab\par  \par  REVIEW OF SYSTEMS:\par  CONSTITUTIONAL: alert  comfortable no  complaints\par  \par  NECK: No pain or stiffnes\par  RESPIRATORY: No SOB \par  CARDIOVASCULAR: No chest pain, palpitations, dizziness, \par  GASTROINTESTINAL: No abdominal pain. No nausea, vomiting, \par  NEUROLOGICAL: No headaches, no  blurry  vision no  dizziness\par  SKIN: No itching, \par  MUSCULOSKELETAL: No pain\par  \par  MEDICATION:\par  calcitriol   Capsule 0.25 MICROGram(s) Oral daily\par  dextrose 5% + lactated ringers. 1000 milliLiter(s) IV Continuous <Continuous>\par  dextrose 5%. 1000 milliLiter(s) IV Continuous <Continuous>\par  dextrose 5%. 1000 milliLiter(s) IV Continuous <Continuous>\par  dextrose 50% Injectable 25 Gram(s) IV Push once\par  dextrose 50% Injectable 25 Gram(s) IV Push once\par  dextrose 50% Injectable 12.5 Gram(s) IV Push once\par  dextrose Oral Gel 15 Gram(s) Oral once PRN\par  donepezil 10 milliGRAM(s) Oral at bedtime\par  doxazosin 1 milliGRAM(s) Oral daily\par  escitalopram 10 milliGRAM(s) Oral daily\par  finasteride 5 milliGRAM(s) Oral daily\par  insulin lispro (ADMELOG) corrective regimen sliding scale   SubCutaneous three times a day before meals\par  melatonin 3 milliGRAM(s) Oral at bedtime\par  metoprolol succinate ER 50 milliGRAM(s) Oral daily\par  multivitamin 1 Tablet(s) Oral daily\par  ondansetron Injectable 4 milliGRAM(s) IV Push every 8 hours PRN\par  pantoprazole    Tablet 40 milliGRAM(s) Oral before breakfast\par  \par  Vital Signs Last 24 Hrs\par  T(C): 36.7 (07 Feb 2024 05:01), Max: 36.7 (06 Feb 2024 12:26)\par  T(F): 98.1 (07 Feb 2024 05:01), Max: 98.1 (06 Feb 2024 23:09)\par  HR: 65 (07 Feb 2024 05:01) (55 - 67)\par  BP: 138/57 (07 Feb 2024 05:01) (125/77 - 138/83)\par  BP(mean): --\par  RR: 18 (07 Feb 2024 05:01) (18 - 18)\par  SpO2: 98% (07 Feb 2024 05:01) (97% - 99%)\par  \par  \par  \par  PHYSICAL EXAM:\par  GENERAL: NAD, well-groomed, well-developed\par  HEENT : Conjuntivae  clear sclerae anicteric\par  NECK: Supple, No JVD, Normal thyroid\par  NERVOUS SYSTEM:  Alert oriented x1  no  focal  deficits; \par  CHEST/LUNG: Clear  \par  HEART: Regular rate and rhythm; No murmurs, rubs, or gallops\par  ABDOMEN: Soft, Nontender, Nondistended; Bowel sounds present\par  EXTREMITIES:  no  edema no  tenderness\par  SKIN: No rashes \par  LABS:\par           \par             8.0  \par  6.36  )-----------( 194      ( 07 Feb 2024 05:43 )\par             27.7 \par  \par  02-07\par  \par  145  |  113<H>  |  19\par  ----------------------------<  173<H>\par  3.8   |  28  |  1.13\par  \par  Ca    8.7      07 Feb 2024 05:43\par  \par  TPro  5.4<L>  /  Alb  1.7<L>  /  TBili  0.4  /  DBili  x   /  AST  14<L>  /  ALT  11<L>  /  AlkPhos  69  02-07\par  \par  \par  Urinalysis Basic - ( 07 Feb 2024 05:43 )\par  \par  Color: x / Appearance: x / SG: x / pH: x\par  Gluc: 173 mg/dL / Ketone: x  / Bili: x / Urobili: x \par  Blood: x / Protein: x / Nitrite: x \par  Leuk Esterase: x / RBC: x / WBC x \par  Sq Epi: x / Non Sq Epi: x / Bacteria: x\par  \par  \par  CAPILLARY BLOOD GLUCOSE\par  \par  \par  POCT Blood Glucose.: 172 mg/dL (07 Feb 2024 08:26)\par  POCT Blood Glucose.: 240 mg/dL (06 Feb 2024 21:35)\par  POCT Blood Glucose.: 237 mg/dL (06 Feb 2024 17:21)\par  POCT Blood Glucose.: 210 mg/dL (06 Feb 2024 11:16)\par  \par  \par  RADIOLOGY & ADDITIONAL TESTS:\par  \par  Imaging reports  Personally Reviewed:  [ ] YES  [ ] NO\par  \par  Consultant(s) Notes Reviewed:  [x ] YES  [ ] NO\par  \par  Care Discussed with Consultants/Other Providers [x ] YES  [ ] NO\par  \ql\plain\f0\fs24\plain\f0\fs20\kqvg7379\hich\f0\dbch\f0\loch\f0\fs20 edgar\plain\f1\fs16\axij2550\hich\f1\dbch\f1\loch\f1\cf2\fs16  89M PMH HTN, DM, CKD, dementia, legally blind BIBEMS d/t hypoglycemia. Per triage note, FS 40 at home, 80 on ED arrival   s/p glucagon, chocolate. Pt w/o complaints. was    reported  with  nausea  vomiting  admitted  for  further w/u  and Rx\par  discussed  with  pt's  daughter  Niall  patient  with  functional deline over past 7 months unable to walk\par  \par  # nauseaa  and  vomiting \par   ivf  zofran  protonix \par  observe  on oral diet\par  \par  # DM/hypoglycemia\par  d/c  home  meds  \par  cover  with  short  acting  insulin  as per  scale  \par  \par  # dementia  continue donazepil\par  \par  # insomnia melatonin\par  \par  # htn  metoprolol  doxazosin\par  \par  # depression  anxiety   escitalopram\par  \par  # BPH  finasteride doxazosin\par  anemia  acute  on  chronic  check  iron  studies  b12 folate  reapeat  cbc  if  satbel  can  go  to  hrehab  pending  irn b1 folate  reprts\par  \par  #  will D/C  plavix  and  Lipitor  discussed  with patient,'s  daughter  niall  patient  never  had   stroke  or  heart  attack or  peripheral  vascular  diease\par  fpr  PT  evaluation\par  \par  discharge  to  [facility]\par  discussed  with  pt's  daughter\par  \par  \plain\f1\fs16\eyis5736\hich\f1\dbch\f1\loch\f1\cf2\fs16\strike\plain\f1\fs16\mrap9649\hich\f1\dbch\f1\loch\f1\cf2\fs16\pard\plain\f0\fs24\plain\f0\fs20\xdcg9133\hich\f0\dbch\f0\loch\f0\fs20\par  }

## 2024-02-07 NOTE — DISCHARGE NOTE NURSING/CASE MANAGEMENT/SOCIAL WORK - PATIENT PORTAL LINK FT
You can access the FollowMyHealth Patient Portal offered by [facility] by registering at the following website: [email or web address]. By joining [facility] [employer] [facility] [employer]’s FollowMyHealth portal, you will also be able to view your health information using other applications (apps) compatible with our system.

## 2024-02-07 NOTE — DISCHARGE NOTE PROVIDER - HOSPITAL COURSE
H&P: 89M PMH HTN, DM, CKD, anemia , dementia, legally blind BIBEMS for ams noted with hypoglycemia /acute kidney injury  .pt evaluated by dietician and is tolerating constant carbohydrate diet . Patient was evaluated by physical therapy and will benefit from sub acute rehab . renal function improved with fluids . pt stable for discharge . H&P: 89M PMH HTN, DM, CKD, anemia , dementia, legally blind BIBEMS for ams noted with hypoglycemia /acute kidney injury  .pt evaluated by dietician and is tolerating constant carbohydrate diet . Patient was evaluated by physical therapy and will benefit from sub acute rehab . renal function improved with fluids . pt stable for discharge .    dementia  htn'  dm  ckd  anemia  bph  blind  hypoglycemia

## 2024-02-07 NOTE — DISCHARGE NOTE PROVIDER - CARE PROVIDER_API CALL
Randell Atkinson  Internal Medicine  [address] [place] [id number]  Phone: [phone]  Fax: [phone]  Follow Up Time:

## 2024-02-07 NOTE — PROGRESS NOTE ADULT - REASON FOR ADMISSION
intractable  nausea  and  vomiting
none

## 2024-02-07 NOTE — DISCHARGE NOTE PROVIDER - NSDCCPCAREPLAN_GEN_ALL_CORE_FT
PRINCIPAL DISCHARGE DIAGNOSIS  Diagnosis: Hypoglycemia  Assessment and Plan of Treatment: resolved please continue current diabetic medication plan please eat 3 meals a day      SECONDARY DISCHARGE DIAGNOSES  Diagnosis: CELESTE (acute kidney injury)  Assessment and Plan of Treatment: resolved

## 2024-02-07 NOTE — DISCHARGE NOTE PROVIDER - NSDCFUADDINST_GEN_ALL_CORE_FT
Diet, Consistent Carbohydrate w/Evening Snack:   Low Sodium  Supplement Feeding Modality:  Oral  Glucerna Shake Cans or Servings Per Day:  1       Frequency:  Daily (02-06-24 @ 11:17) [Active]

## 2024-02-07 NOTE — DISCHARGE NOTE PROVIDER - NSDCMRMEDTOKEN_GEN_ALL_CORE_FT
atorvastatin 80 mg oral tablet: orally once a day (at bedtime)  calcitriol 0.25 mcg oral capsule: orally 3 times a week  cefdinir 300 mg oral capsule: 1 cap(s) orally 3 times a day  clopidogrel 75 mg oral tablet: 1 tab(s) orally once a day  donepezil 10 mg oral tablet: 1 tab(s) orally once a day (at bedtime)  doxazosin 1 mg oral tablet: 1 tab(s) orally once a day  escitalopram 10 mg oral tablet: 1 tab(s) orally once a day  esomeprazole 40 mg oral delayed release capsule: 1 cap(s) orally once a day  finasteride 5 mg oral tablet: 1 tab(s) orally once a day  Melatonin 5 mg oral tablet: 1 tab(s) orally once a day (at bedtime)  Metoprolol Succinate ER 50 mg oral tablet, extended release: 1 tab(s) orally once a day  Nephro-Nima oral tablet: 1 tab(s) orally once a day  repaglinide 1 mg oral tablet: 1 tab(s) orally 3 times a day (before meals)  Tradjenta 5 mg oral tablet: 1 tab(s) orally once a day   atorvastatin 80 mg oral tablet: orally once a day (at bedtime)  calcitriol 0.25 mcg oral capsule: orally 3 times a week  clopidogrel 75 mg oral tablet: 1 tab(s) orally once a day  donepezil 10 mg oral tablet: 1 tab(s) orally once a day (at bedtime)  doxazosin 1 mg oral tablet: 1 tab(s) orally once a day  escitalopram 10 mg oral tablet: 1 tab(s) orally once a day  esomeprazole 40 mg oral delayed release capsule: 1 cap(s) orally once a day  finasteride 5 mg oral tablet: 1 tab(s) orally once a day  insulin lispro 100 units/mL injectable solution: 2 injectable 3 times a day (with meals) sliding scale with meals  Melatonin 5 mg oral tablet: 1 tab(s) orally once a day (at bedtime)  Metoprolol Succinate ER 50 mg oral tablet, extended release: 1 tab(s) orally once a day  Nephro-Nima oral tablet: 1 tab(s) orally once a day

## 2024-02-15 DIAGNOSIS — N40.0 BENIGN PROSTATIC HYPERPLASIA WITHOUT LOWER URINARY TRACT SYMPTOMS: ICD-10-CM

## 2024-02-15 DIAGNOSIS — I12.9 HYPERTENSIVE CHRONIC KIDNEY DISEASE WITH STAGE 1 THROUGH STAGE 4 CHRONIC KIDNEY DISEASE, OR UNSPECIFIED CHRONIC KIDNEY DISEASE: ICD-10-CM

## 2024-02-15 DIAGNOSIS — E11.649 TYPE 2 DIABETES MELLITUS WITH HYPOGLYCEMIA WITHOUT COMA: ICD-10-CM

## 2024-02-15 DIAGNOSIS — E11.22 TYPE 2 DIABETES MELLITUS WITH DIABETIC CHRONIC KIDNEY DISEASE: ICD-10-CM

## 2024-02-15 DIAGNOSIS — D63.1 ANEMIA IN CHRONIC KIDNEY DISEASE: ICD-10-CM

## 2024-02-15 DIAGNOSIS — N18.30 CHRONIC KIDNEY DISEASE, STAGE 3 UNSPECIFIED: ICD-10-CM

## 2024-02-15 DIAGNOSIS — N17.9 ACUTE KIDNEY FAILURE, UNSPECIFIED: ICD-10-CM

## 2024-02-15 DIAGNOSIS — Z79.84 LONG TERM (CURRENT) USE OF ORAL HYPOGLYCEMIC DRUGS: ICD-10-CM

## 2024-02-15 DIAGNOSIS — E86.0 DEHYDRATION: ICD-10-CM

## 2024-02-15 DIAGNOSIS — F41.9 ANXIETY DISORDER, UNSPECIFIED: ICD-10-CM

## 2024-02-15 DIAGNOSIS — Z79.899 OTHER LONG TERM (CURRENT) DRUG THERAPY: ICD-10-CM

## 2024-02-15 DIAGNOSIS — H54.8 LEGAL BLINDNESS, AS DEFINED IN USA: ICD-10-CM

## 2024-02-15 DIAGNOSIS — F03.90 UNSPECIFIED DEMENTIA WITHOUT BEHAVIORAL DISTURBANCE: ICD-10-CM

## 2024-02-15 DIAGNOSIS — G47.00 INSOMNIA, UNSPECIFIED: ICD-10-CM

## 2025-02-03 NOTE — H&P ADULT - I WAS PHYSICALLY PRESENT FOR THE KEY PORTIONS OF THE EVALUATION AND MANAGEMENT (E/M) SERVICE PROVIDED.  I AGREE WITH THE ABOVE HISTORY, PHYSICAL, AND PLAN WHICH I HAVE REVIEWED AND EDITED WHERE APPROPRIATE
Render In Strict Bullet Format?: No Continue Regimen: .\\n- Rhofade 1% cream: Apply to the entire face QAM \\n- Azelaic Acid 15 % gel: Apply to entire face QHS Detail Level: Zone Statement Selected